# Patient Record
Sex: FEMALE | Race: WHITE | NOT HISPANIC OR LATINO | Employment: OTHER | URBAN - METROPOLITAN AREA
[De-identification: names, ages, dates, MRNs, and addresses within clinical notes are randomized per-mention and may not be internally consistent; named-entity substitution may affect disease eponyms.]

---

## 2017-02-08 ENCOUNTER — HOSPITAL ENCOUNTER (OUTPATIENT)
Dept: ULTRASOUND IMAGING | Facility: CLINIC | Age: 62
Discharge: HOME | End: 2017-02-08
Payer: COMMERCIAL

## 2017-02-08 ENCOUNTER — HOSPITAL ENCOUNTER (OUTPATIENT)
Dept: MAMMOGRAPHY | Facility: CLINIC | Age: 62
Discharge: HOME | End: 2017-02-08
Payer: COMMERCIAL

## 2017-02-08 DIAGNOSIS — N64.4 BREAST PAIN: ICD-10-CM

## 2017-02-08 DIAGNOSIS — Z09 FOLLOW-UP EXAM, 3-6 MONTHS SINCE PREVIOUS EXAM: ICD-10-CM

## 2017-02-08 DIAGNOSIS — N64.4 MASTODYNIA: ICD-10-CM

## 2017-02-08 PROCEDURE — G0204 DX MAMMO INCL CAD BI: HCPCS

## 2017-02-08 PROCEDURE — 76642 ULTRASOUND BREAST LIMITED: CPT

## 2017-02-28 ENCOUNTER — TRANSCRIBE ORDERS (OUTPATIENT)
Dept: ADMINISTRATIVE | Facility: HOSPITAL | Age: 62
End: 2017-02-28

## 2017-02-28 DIAGNOSIS — Z12.31 OTHER SCREENING MAMMOGRAM: Primary | ICD-10-CM

## 2017-11-26 ENCOUNTER — APPOINTMENT (EMERGENCY)
Dept: RADIOLOGY | Facility: HOSPITAL | Age: 62
End: 2017-11-26
Payer: COMMERCIAL

## 2017-11-26 ENCOUNTER — HOSPITAL ENCOUNTER (EMERGENCY)
Facility: HOSPITAL | Age: 62
Discharge: HOME | End: 2017-11-26
Attending: EMERGENCY MEDICINE | Admitting: EMERGENCY MEDICINE
Payer: COMMERCIAL

## 2017-11-26 VITALS
OXYGEN SATURATION: 98 % | SYSTOLIC BLOOD PRESSURE: 128 MMHG | HEIGHT: 65 IN | WEIGHT: 247.36 LBS | BODY MASS INDEX: 41.21 KG/M2 | RESPIRATION RATE: 16 BRPM | TEMPERATURE: 98 F | HEART RATE: 78 BPM | DIASTOLIC BLOOD PRESSURE: 92 MMHG

## 2017-11-26 DIAGNOSIS — T78.40XA ALLERGIC REACTION, INITIAL ENCOUNTER: Primary | ICD-10-CM

## 2017-11-26 DIAGNOSIS — R07.89 ATYPICAL CHEST PAIN: ICD-10-CM

## 2017-11-26 LAB
ANION GAP SERPL CALCULATED.3IONS-SCNC: 8 MMOL/L (ref 4–13)
ATRIAL RATE: 84 BPM
BASOPHILS # BLD AUTO: 0.05 THOUSANDS/ΜL (ref 0–0.1)
BASOPHILS NFR BLD AUTO: 1 % (ref 0–1)
BUN SERPL-MCNC: 12 MG/DL (ref 5–25)
CALCIUM SERPL-MCNC: 9.1 MG/DL (ref 8.3–10.1)
CHLORIDE SERPL-SCNC: 102 MMOL/L (ref 100–108)
CO2 SERPL-SCNC: 28 MMOL/L (ref 21–32)
CREAT SERPL-MCNC: 0.89 MG/DL (ref 0.6–1.3)
EOSINOPHIL # BLD AUTO: 0.55 THOUSAND/ΜL (ref 0–0.61)
EOSINOPHIL NFR BLD AUTO: 8 % (ref 0–6)
ERYTHROCYTE [DISTWIDTH] IN BLOOD BY AUTOMATED COUNT: 15.3 % (ref 11.6–15.1)
GFR SERPL CREATININE-BSD FRML MDRD: 71 ML/MIN/1.73SQ M
GLUCOSE SERPL-MCNC: 212 MG/DL (ref 65–140)
HCT VFR BLD AUTO: 35.1 % (ref 34.8–46.1)
HGB BLD-MCNC: 10.8 G/DL (ref 11.5–15.4)
LYMPHOCYTES # BLD AUTO: 1.83 THOUSANDS/ΜL (ref 0.6–4.47)
LYMPHOCYTES NFR BLD AUTO: 27 % (ref 14–44)
MCH RBC QN AUTO: 23.9 PG (ref 26.8–34.3)
MCHC RBC AUTO-ENTMCNC: 30.8 G/DL (ref 31.4–37.4)
MCV RBC AUTO: 78 FL (ref 82–98)
MONOCYTES # BLD AUTO: 0.5 THOUSAND/ΜL (ref 0.17–1.22)
MONOCYTES NFR BLD AUTO: 7 % (ref 4–12)
NEUTROPHILS # BLD AUTO: 3.8 THOUSANDS/ΜL (ref 1.85–7.62)
NEUTS SEG NFR BLD AUTO: 57 % (ref 43–75)
P AXIS: 53 DEGREES
PLATELET # BLD AUTO: 368 THOUSANDS/UL (ref 149–390)
PMV BLD AUTO: 9.5 FL (ref 8.9–12.7)
POTASSIUM SERPL-SCNC: 3.9 MMOL/L (ref 3.5–5.3)
PR INTERVAL: 150 MS
QRS AXIS: 52 DEGREES
QRSD INTERVAL: 74 MS
QT INTERVAL: 342 MS
QTC INTERVAL: 404 MS
RBC # BLD AUTO: 4.52 MILLION/UL (ref 3.81–5.12)
SODIUM SERPL-SCNC: 138 MMOL/L (ref 136–145)
T WAVE AXIS: 28 DEGREES
TROPONIN I SERPL-MCNC: <0.02 NG/ML
VENTRICULAR RATE: 84 BPM
WBC # BLD AUTO: 6.73 THOUSAND/UL (ref 4.31–10.16)

## 2017-11-26 PROCEDURE — 36415 COLL VENOUS BLD VENIPUNCTURE: CPT | Performed by: PHYSICIAN ASSISTANT

## 2017-11-26 PROCEDURE — 99285 EMERGENCY DEPT VISIT HI MDM: CPT

## 2017-11-26 PROCEDURE — 93005 ELECTROCARDIOGRAM TRACING: CPT | Performed by: PHYSICIAN ASSISTANT

## 2017-11-26 PROCEDURE — 85025 COMPLETE CBC W/AUTO DIFF WBC: CPT | Performed by: PHYSICIAN ASSISTANT

## 2017-11-26 PROCEDURE — 96375 TX/PRO/DX INJ NEW DRUG ADDON: CPT

## 2017-11-26 PROCEDURE — 71020 HB CHEST X-RAY 2VW FRONTAL&LATL: CPT

## 2017-11-26 PROCEDURE — 84484 ASSAY OF TROPONIN QUANT: CPT | Performed by: PHYSICIAN ASSISTANT

## 2017-11-26 PROCEDURE — 96374 THER/PROPH/DIAG INJ IV PUSH: CPT

## 2017-11-26 PROCEDURE — 80048 BASIC METABOLIC PNL TOTAL CA: CPT | Performed by: PHYSICIAN ASSISTANT

## 2017-11-26 RX ORDER — PREDNISONE 20 MG/1
40 TABLET ORAL DAILY
Qty: 10 TABLET | Refills: 0 | Status: SHIPPED | OUTPATIENT
Start: 2017-11-26 | End: 2017-12-01

## 2017-11-26 RX ORDER — HYDROXYZINE HYDROCHLORIDE 25 MG/1
25 TABLET, FILM COATED ORAL 3 TIMES DAILY PRN
Qty: 30 TABLET | Refills: 0 | Status: SHIPPED | OUTPATIENT
Start: 2017-11-26 | End: 2019-09-04 | Stop reason: ALTCHOICE

## 2017-11-26 RX ORDER — DIPHENHYDRAMINE HYDROCHLORIDE 50 MG/ML
25 INJECTION INTRAMUSCULAR; INTRAVENOUS ONCE
Status: COMPLETED | OUTPATIENT
Start: 2017-11-26 | End: 2017-11-26

## 2017-11-26 RX ORDER — METHYLPREDNISOLONE SODIUM SUCCINATE 125 MG/2ML
80 INJECTION, POWDER, LYOPHILIZED, FOR SOLUTION INTRAMUSCULAR; INTRAVENOUS ONCE
Status: COMPLETED | OUTPATIENT
Start: 2017-11-26 | End: 2017-11-26

## 2017-11-26 RX ADMIN — METHYLPREDNISOLONE SODIUM SUCCINATE 80 MG: 125 INJECTION, POWDER, FOR SOLUTION INTRAMUSCULAR; INTRAVENOUS at 11:54

## 2017-11-26 RX ADMIN — DIPHENHYDRAMINE HYDROCHLORIDE 25 MG: 50 INJECTION, SOLUTION INTRAMUSCULAR; INTRAVENOUS at 11:48

## 2017-11-26 NOTE — DISCHARGE INSTRUCTIONS
Chest Pain   WHAT YOU NEED TO KNOW:   Chest pain can be caused by a range of conditions, from not serious to life-threatening  Chest pain can be a symptom of a digestive problem, such as acid reflux or a stomach ulcer  An anxiety attack or a strong emotion, such as anger, can also cause chest pain  Infection, inflammation, or a fracture in the bones or cartilage in your chest can cause pain or discomfort  Sometimes chest pain or pressure is caused by poor blood flow to your heart (angina)  Chest pain may also be caused by life-threatening conditions such as a heart attack or blood clot in your lungs  DISCHARGE INSTRUCTIONS:   Call 911 if:   · You have any of the following signs of a heart attack:      ¨ Squeezing, pressure, or pain in your chest that lasts longer than 5 minutes or returns    ¨ Discomfort or pain in your back, neck, jaw, stomach, or arm     ¨ Trouble breathing    ¨ Nausea or vomiting    ¨ Lightheadedness or a sudden cold sweat, especially with chest pain or trouble breathing    Return to the emergency department if:   · You have chest discomfort that gets worse, even with medicine  · You cough or vomit blood  · Your bowel movements are black or bloody  · You cannot stop vomiting, or it hurts to swallow  Contact your healthcare provider if:   · You have questions or concerns about your condition or care  Medicines:   · Medicines  may be given to treat the cause of your chest pain  Examples include pain medicine, anxiety medicine, or medicines to increase blood flow to your heart  · Do not take certain medicines without asking your healthcare provider first   These include NSAIDs, herbal or vitamin supplements, or hormones (estrogen or progestin)  · Take your medicine as directed  Contact your healthcare provider if you think your medicine is not helping or if you have side effects  Tell him or her if you are allergic to any medicine   Keep a list of the medicines, vitamins, and herbs you take  Include the amounts, and when and why you take them  Bring the list or the pill bottles to follow-up visits  Carry your medicine list with you in case of an emergency  Follow up with your healthcare provider within 72 hours, or as directed: You may need to return for more tests to find the cause of your chest pain  You may be referred to a specialist, such as a cardiologist or gastroenterologist  Write down your questions so you remember to ask them during your visits  Healthy living tips: The following are general healthy guidelines  If your chest pain is caused by a heart problem, your healthcare provider will give you specific guidelines to follow  · Do not smoke  Nicotine and other chemicals in cigarettes and cigars can cause lung and heart damage  Ask your healthcare provider for information if you currently smoke and need help to quit  E-cigarettes or smokeless tobacco still contain nicotine  Talk to your healthcare provider before you use these products  · Eat a variety of healthy, low-fat foods  Healthy foods include fruits, vegetables, whole-grain breads, low-fat dairy products, beans, lean meats, and fish  Ask for more information about a heart healthy diet  · Ask about activity  Your healthcare provider will tell you which activities to limit or avoid  Ask when you can drive, return to work, and have sex  Ask about the best exercise plan for you  · Maintain a healthy weight  Ask your healthcare provider how much you should weigh  Ask him or her to help you create a weight loss plan if you are overweight  · Get the flu and pneumonia vaccines  All adults should get the influenza (flu) vaccine  Get it every year as soon as it becomes available  The pneumococcal vaccine is given to adults aged 72 years or older  The vaccine is given every 5 years to prevent pneumococcal disease, such as pneumonia    © 2017 [employer] Information is for End User's use only and may not be sold, redistributed or otherwise used for commercial purposes  All illustrations and images included in CareNotes® are the copyrighted property of A D A M [employer] Inc  or Vitaliy Melendez  The above information is an  only  It is not intended as medical advice for individual conditions or treatments  Talk to your doctor, nurse or pharmacist before following any medical regimen to see if it is safe and effective for you  General Allergic Reaction   WHAT YOU NEED TO KNOW:   An allergic reaction is your body's response to an allergen  Allergens include medicines, food, insect stings, animal dander, mold, latex, chemicals, and dust mites  Pollen from trees, grass, and weeds can also cause an allergic reaction  DISCHARGE INSTRUCTIONS:   Return to the emergency department if:   · You have a skin rash, hives, swelling, or itching that gets worse  · You have trouble breathing, shortness of breath, wheezing, or coughing  · Your throat tightens, or your lips or tongue swell  · You have trouble swallowing or speaking  · You have dizziness, lightheadedness, fainting, or confusion  · You have nausea, vomiting, diarrhea, or abdominal cramps  · You have chest pain or tightness  Contact your healthcare provider if:   · You have questions or concerns about your condition or care  Medicines:   · Medicines  may be given to relieve certain allergy symptoms such as itching, sneezing, and swelling  You may take them as a pill or use drops in your nose or eyes  Topical treatments may be given to put directly on your skin to help decrease itching or swelling  · Take your medicine as directed  Contact your healthcare provider if you think your medicine is not helping or if you have side effects  Tell him of her if you are allergic to any medicine  Keep a list of the medicines, vitamins, and herbs you take  Include the amounts, and when and why you take them  Bring the list or the pill bottles to follow-up visits  Carry your medicine list with you in case of an emergency  Follow up with your healthcare provider as directed:  Write down your questions so you remember to ask them during your visits  Self-care:   · Avoid the allergen  that you think may have caused your allergic reaction  · Use cold compresses  on your skin or eyes if they were affected by the allergic reaction  Cold compresses may help to soothe your skin or eyes  · Rinse your nasal passages  with a saline solution  Daily rinsing may help clear your nose of allergens  · Do not smoke  Your allergy symptoms may decrease if you are not around smoke  Nicotine and other chemicals in cigarettes and cigars can also cause lung damage  Ask your healthcare provider for information if you currently smoke and need help to quit  E-cigarettes or smokeless tobacco still contain nicotine  Talk to your healthcare provider before you use these products  © 2017 [employer]  Information is for End User's use only and may not be sold, redistributed or otherwise used for commercial purposes  All illustrations and images included in CareNotes® are the copyrighted property of [employer]  or Vitaliy Melendez  The above information is an  only  It is not intended as medical advice for individual conditions or treatments  Talk to your doctor, nurse or pharmacist before following any medical regimen to see if it is safe and effective for you

## 2017-11-26 NOTE — ED PROVIDER NOTES
History  Chief Complaint   Patient presents with    Rash     Patient presents to the ED for evaluation and treatment of generalized rash that started overnight  Patient stated that she was a little itchy before going to bed, but thought it was her normal "itching" related to dry skin and psoariasis  Awoke today with hives and increased itching  Patient did begin taking a medication - has been on it before, but new   No benadryl PTA    Chest Pain       61-year-old female presents emergency room for evaluation itchy rash  Onset last night progressively increased this morning when she awoke  Generalized throughout body  Patient takes allergy medicine daily for chronic rash along her breast directed by dermatologist   She states she did take this this morning it did not help rash  Patient states 1 of the many fractures of her medications had changed and she just started taking   Her new prescription yesterday for the 1st time  Denies shortness of breath however complains of chest heaviness  States this has been ongoing for the past 3 days  Denies chest pain  Denies cough, throat or tongue swelling  The patient is being treated for high cholesterol and diabetes  She does have a positive family history for significant heart disease as her brother had heart attack at age 54  History provided by:  Patient  Rash   Associated symptoms: no fever and no shortness of breath    Chest Pain   Associated symptoms: no back pain, no dysphagia, no fever, no palpitations and no shortness of breath        Prior to Admission Medications   Prescriptions Last Dose Informant Patient Reported? Taking? ALPRAZolam (XANAX) 0 5 mg tablet   Yes Yes   Sig: Take 0 5 mg by mouth daily at bedtime as needed for anxiety  Ascorbic Acid (VITAMIN C) 1000 MG tablet   Yes Yes   Sig: Take 1,000 mg by mouth daily     METHSCOPOLAMINE BROMIDE PO   Yes Yes   Sig: Take 2 5 mg by mouth 2 (two) times a day as needed UNKNOWN TO PATIENT   Yes Yes   Sig: Take 1 tablet by mouth 2 (two) times a day   aspirin 81 MG tablet   Yes No   Sig: Take 81 mg by mouth daily  atorvastatin (LIPITOR) 20 mg tablet   Yes Yes   Sig: Take 20 mg by mouth daily  calcium carbonate (TUMS EX) 750 mg chewable tablet   Yes No   Sig: Chew 1 tablet daily  estradiol (ESTRACE) 1 mg tablet   Yes Yes   Sig: Take 1 mg by mouth daily  metFORMIN (GLUCOPHAGE) 1000 MG tablet   Yes Yes   Sig: Take 1,000 mg by mouth 2 (two) times a day with meals     omeprazole (PriLOSEC) 40 MG capsule   Yes Yes   Sig: Take 40 mg by mouth daily  Facility-Administered Medications: None       History reviewed  No pertinent past medical history  History reviewed  No pertinent surgical history  History reviewed  No pertinent family history  I have reviewed and agree with the history as documented  Social History   Substance Use Topics    Smoking status: Former Smoker    Smokeless tobacco: Never Used    Alcohol use 1 8 oz/week     3 Glasses of wine per week        Review of Systems   Constitutional: Negative for chills and fever  HENT: Negative for facial swelling, trouble swallowing and voice change  Eyes: Negative for redness  Respiratory: Negative for chest tightness and shortness of breath  Cardiovascular: Positive for chest pain  Negative for palpitations  Musculoskeletal: Negative for back pain and neck pain  Skin: Positive for rash         Physical Exam  ED Triage Vitals [11/26/17 1125]   Temperature Pulse Respirations Blood Pressure SpO2   98 °F (36 7 °C) 91 16 167/89 96 %      Temp Source Heart Rate Source Patient Position - Orthostatic VS BP Location FiO2 (%)   Oral Monitor Lying Right arm --      Pain Score       6           Orthostatic Vital Signs  Vitals:    11/26/17 1125 11/26/17 1145 11/26/17 1200   BP: 167/89 154/88 151/88   Pulse: 91 86 82   Patient Position - Orthostatic VS: Lying Sitting Sitting       Physical Exam   Constitutional: She appears well-developed and well-nourished  HENT:   Head: Normocephalic and atraumatic  Mouth/Throat: Oropharynx is clear and moist    Eyes: Conjunctivae are normal    Neck: Neck supple  Cardiovascular: Normal rate, regular rhythm and normal heart sounds  Pulmonary/Chest: Effort normal and breath sounds normal  She exhibits no tenderness  Abdominal: Soft  Bowel sounds are normal  She exhibits no distension  There is no tenderness  There is no CVA tenderness  Neurological: She is alert  Skin: Skin is warm and dry  Rash noted  No ecchymosis, no petechiae and no purpura noted  Rash is maculopapular and urticarial  Rash is not vesicular  There is erythema  Generalized throughout body   Psychiatric: She has a normal mood and affect  Nursing note and vitals reviewed  ED Medications  Medications   diphenhydrAMINE (BENADRYL) injection 25 mg (25 mg Intravenous Given 11/26/17 1148)   methylPREDNISolone sodium succinate (Solu-MEDROL) injection 80 mg (80 mg Intravenous Given 11/26/17 1154)       Diagnostic Studies  Results Reviewed     Procedure Component Value Units Date/Time    Troponin I [[id number]]  (Normal) Collected:  11/26/17 1149    Lab Status:  Final result Specimen:  Blood from Arm, Left Updated:  11/26/17 1215     Troponin I <0 02 ng/mL     Narrative:         [employer] Chemistry analyzer 99% cutoff is > 0 04 ng/mL in network labs    o cTnI 99% cutoff is useful only when applied to patients in the clinical setting of myocardial ischemia  o cTnI 99% cutoff should be interpreted in the context of clinical history, ECG findings and possibly cardiac imaging to establish correct diagnosis  o cTnI 99% cutoff may be suggestive but clearly not indicative of a coronary event without the clinical setting of myocardial ischemia      Basic metabolic panel [[id number]]  (Abnormal) Collected:  11/26/17 1149    Lab Status:  Final result Specimen:  Blood from Arm, Left Updated:  11/26/17 1207     Sodium 138 mmol/L      Potassium 3 9 mmol/L      Chloride 102 mmol/L      CO2 28 mmol/L      Anion Gap 8 mmol/L      BUN 12 mg/dL      Creatinine 0 89 mg/dL      Glucose 212 (H) mg/dL      Calcium 9 1 mg/dL      eGFR 71 ml/min/1 73sq m     Narrative:         National Kidney Disease Education Program recommendations are as follows:  GFR calculation is accurate only with a steady state creatinine  Chronic Kidney disease less than 60 ml/min/1 73 sq  meters  Kidney failure less than 15 ml/min/1 73 sq  meters      CBC and differential [[id number]]  (Abnormal) Collected:  11/26/17 1149    Lab Status:  Final result Specimen:  Blood from Arm, Left Updated:  11/26/17 1156     WBC 6 73 Thousand/uL      RBC 4 52 Million/uL      Hemoglobin 10 8 (L) g/dL      Hematocrit 35 1 %      MCV 78 (L) fL      MCH 23 9 (L) pg      MCHC 30 8 (L) g/dL      RDW 15 3 (H) %      MPV 9 5 fL      Platelets 952 Thousands/uL      Neutrophils Relative 57 %      Lymphocytes Relative 27 %      Monocytes Relative 7 %      Eosinophils Relative 8 (H) %      Basophils Relative 1 %      Neutrophils Absolute 3 80 Thousands/µL      Lymphocytes Absolute 1 83 Thousands/µL      Monocytes Absolute 0 50 Thousand/µL      Eosinophils Absolute 0 55 Thousand/µL      Basophils Absolute 0 05 Thousands/µL                  XR chest 2 views   ED Interpretation by Carlena Lennox, PA-C (11/26 1234)   No acute disease                 Procedures  ECG 12 Lead Documentation  Date/Time: 11/26/2017 3:24 PM  Performed by: Christian Linares  Authorized by: Ellis Barrios     Indications / Diagnosis:  Chest pain  ECG reviewed by me, the ED Provider: yes    Patient location:  ED  Interpretation:     Interpretation: normal    Rate:     ECG rate:  84    ECG rate assessment: normal    Rhythm:     Rhythm: sinus rhythm    Ectopy:     Ectopy: none    QRS:     QRS axis:  Normal  Conduction:     Conduction: normal    ST segments:     ST segments:  Normal  T waves:     T waves: normal             Phone Contacts  ED Phone Contact    ED Course  ED Course                                MDM  Number of Diagnoses or Management Options  Allergic reaction, initial encounter: new and requires workup  Atypical chest pain: new and requires workup     Amount and/or Complexity of Data Reviewed  Clinical lab tests: ordered and reviewed  Tests in the radiology section of CPT®: ordered and reviewed    Risk of Complications, Morbidity, and/or Mortality  Presenting problems: moderate  Diagnostic procedures: moderate  General comments: Differential diagnosis includes but is not limited to: atypical acs, angina, pnx, pna, allergic reaction      Discussed with patient importance of monitoring her sugars at home while on prednisone as it may elevate her sugar  She understands and agrees to do so  Patient Progress  Patient progress: stable    CritCare Time    Disposition  Final diagnoses: Allergic reaction, initial encounter   Atypical chest pain     Time reflects when diagnosis was documented in both MDM as applicable and the Disposition within this note     Time User Action Codes Description Comment    11/26/2017 12:44 PM Fabrice NARANJO Add [T78 [id number]] Allergic reaction, initial encounter     11/26/2017 12:45 PM Ashlee Rout Add [R07 [id number]] Atypical chest pain       ED Disposition     ED Disposition Condition Comment    Discharge  [place] [id number] discharge to home/self care      Condition at discharge: Good        Follow-up Information     Follow up With Specialties Details Why Contact Info Additional Information    Noris Covington MD  In 3 days  [address]  [address]       [facility] Emergency Department Emergency Medicine  If symptoms worsen [address] [facility] [address] [phone] [facility] ED, [address], [place], [place], [address]        Patient's Medications   Discharge Prescriptions    HYDROXYZINE HCL (ATARAX) 25 MG TABLET    Take 1 tablet by mouth 3 (three) times a day as needed for itching for up to 10 days       Start Date: 11/26/2017End Date: 12/6/2017       Order Dose: 25 mg       Quantity: 30 tablet    Refills: 0    PREDNISONE 20 MG TABLET    Take 2 tablets by mouth daily for 5 days       Start Date: 11/26/2017End Date: 12/1/2017       Order Dose: 40 mg       Quantity: 10 tablet    Refills: 0     No discharge procedures on file      ED Provider  Electronically Signed by           Ralph Wallace PA-C  11/26/17 1522

## 2018-02-14 ENCOUNTER — HOSPITAL ENCOUNTER (OUTPATIENT)
Dept: ULTRASOUND IMAGING | Facility: CLINIC | Age: 63
Discharge: HOME | End: 2018-02-14
Payer: COMMERCIAL

## 2018-02-14 ENCOUNTER — HOSPITAL ENCOUNTER (OUTPATIENT)
Dept: MAMMOGRAPHY | Facility: CLINIC | Age: 63
Discharge: HOME | End: 2018-02-14
Payer: COMMERCIAL

## 2018-02-14 DIAGNOSIS — R92.8 ABNORMAL FINDINGS ON DIAGNOSTIC IMAGING OF BREAST: ICD-10-CM

## 2018-02-14 DIAGNOSIS — Z12.31 OTHER SCREENING MAMMOGRAM: ICD-10-CM

## 2018-02-14 PROCEDURE — 77067 SCR MAMMO BI INCL CAD: CPT

## 2018-02-14 PROCEDURE — 76642 ULTRASOUND BREAST LIMITED: CPT

## 2018-11-16 ENCOUNTER — TRANSCRIBE ORDERS (OUTPATIENT)
Dept: LAB | Facility: CLINIC | Age: 63
End: 2018-11-16

## 2018-11-16 ENCOUNTER — APPOINTMENT (OUTPATIENT)
Dept: LAB | Facility: CLINIC | Age: 63
End: 2018-11-16
Payer: COMMERCIAL

## 2018-11-16 DIAGNOSIS — E11.9 DIABETES MELLITUS WITHOUT COMPLICATION (HCC): Primary | ICD-10-CM

## 2018-11-16 LAB
EST. AVERAGE GLUCOSE BLD GHB EST-MCNC: 174 MG/DL
HBA1C MFR BLD: 7.7 % (ref 4.2–6.3)

## 2018-11-16 PROCEDURE — 36415 COLL VENOUS BLD VENIPUNCTURE: CPT | Performed by: INTERNAL MEDICINE

## 2018-11-16 PROCEDURE — 83036 HEMOGLOBIN GLYCOSYLATED A1C: CPT | Performed by: INTERNAL MEDICINE

## 2018-11-29 ENCOUNTER — TRANSCRIBE ORDERS (OUTPATIENT)
Dept: ADMINISTRATIVE | Facility: HOSPITAL | Age: 63
End: 2018-11-29

## 2018-11-29 DIAGNOSIS — Z12.39 SCREENING BREAST EXAMINATION: Primary | ICD-10-CM

## 2019-02-13 ENCOUNTER — ANNUAL EXAM (OUTPATIENT)
Dept: OBGYN CLINIC | Facility: CLINIC | Age: 64
End: 2019-02-13
Payer: COMMERCIAL

## 2019-02-13 VITALS
WEIGHT: 232 LBS | DIASTOLIC BLOOD PRESSURE: 80 MMHG | HEIGHT: 65 IN | SYSTOLIC BLOOD PRESSURE: 124 MMHG | BODY MASS INDEX: 38.65 KG/M2

## 2019-02-13 DIAGNOSIS — Z12.39 SCREENING BREAST EXAMINATION: ICD-10-CM

## 2019-02-13 DIAGNOSIS — N95.2 VAGINAL ATROPHY: ICD-10-CM

## 2019-02-13 DIAGNOSIS — G47.00 INSOMNIA, UNSPECIFIED TYPE: ICD-10-CM

## 2019-02-13 DIAGNOSIS — Z01.419 ENCOUNTER FOR ANNUAL ROUTINE GYNECOLOGICAL EXAMINATION: Primary | ICD-10-CM

## 2019-02-13 PROCEDURE — S0612 ANNUAL GYNECOLOGICAL EXAMINA: HCPCS | Performed by: OBSTETRICS & GYNECOLOGY

## 2019-02-13 RX ORDER — LORAZEPAM 0.5 MG/1
1 TABLET ORAL
Qty: 90 TABLET | Refills: 3 | Status: SHIPPED | OUTPATIENT
Start: 2019-02-13

## 2019-02-13 RX ORDER — LORAZEPAM 0.5 MG/1
TABLET ORAL EVERY 8 HOURS PRN
COMMUNITY
End: 2019-02-13 | Stop reason: SDUPTHER

## 2019-02-13 RX ORDER — ESTRADIOL 1 MG/1
1 TABLET ORAL DAILY
Qty: 90 TABLET | Refills: 3 | Status: SHIPPED | OUTPATIENT
Start: 2019-02-13

## 2019-02-13 NOTE — PROGRESS NOTES
Pt is here for yearly exam  PT is feel well, no bleeding or pelvic pain  PT does feel bloated, every morning  Pt is seeing Gastro

## 2019-02-13 NOTE — PROGRESS NOTES
Pt had UTI in summer, it resolved  Pt being given script for mammogram  Pt denies bleeding, pain, and breast problems  Pt had a colonoscopy last year with Dr Stacy Coppola  No polyps on colonoscopy  Pt had upper endoscopy at the same time  Physical Exam  Heart and Lungs WNL   Breast exam: WNL   Pelvic: absent uterus, no CMT, no adnexa b/l, no salpinx b/l   Gyn otherwise WNL     Impression  Normal GYN exam     Plan  Pt going for mammogram on Friday  Will review results  Lorazepam dose increased to 1 mg (dispensed 90 given 3 refills)   Given refill for estradiol

## 2019-02-15 ENCOUNTER — HOSPITAL ENCOUNTER (OUTPATIENT)
Dept: MAMMOGRAPHY | Facility: CLINIC | Age: 64
Discharge: HOME | End: 2019-02-15
Payer: COMMERCIAL

## 2019-02-15 ENCOUNTER — TRANSCRIBE ORDERS (OUTPATIENT)
Dept: ADMINISTRATIVE | Facility: HOSPITAL | Age: 64
End: 2019-02-15

## 2019-02-15 VITALS — HEIGHT: 65 IN | WEIGHT: 232 LBS | BODY MASS INDEX: 38.65 KG/M2

## 2019-02-15 DIAGNOSIS — Z12.31 ENCOUNTER FOR SCREENING MAMMOGRAM FOR MALIGNANT NEOPLASM OF BREAST: Primary | ICD-10-CM

## 2019-02-15 DIAGNOSIS — Z12.39 SCREENING BREAST EXAMINATION: ICD-10-CM

## 2019-02-15 PROCEDURE — 77067 SCR MAMMO BI INCL CAD: CPT

## 2019-09-04 ENCOUNTER — HOSPITAL ENCOUNTER (OUTPATIENT)
Dept: RADIOLOGY | Age: 64
Discharge: HOME | End: 2019-09-04
Payer: COMMERCIAL

## 2019-09-04 ENCOUNTER — OFFICE VISIT (OUTPATIENT)
Dept: OBGYN CLINIC | Facility: CLINIC | Age: 64
End: 2019-09-04
Payer: COMMERCIAL

## 2019-09-04 VITALS
BODY MASS INDEX: 35.16 KG/M2 | HEIGHT: 65 IN | WEIGHT: 211 LBS | DIASTOLIC BLOOD PRESSURE: 74 MMHG | SYSTOLIC BLOOD PRESSURE: 112 MMHG

## 2019-09-04 DIAGNOSIS — R10.2 PELVIC PAIN: Primary | ICD-10-CM

## 2019-09-04 DIAGNOSIS — R10.2 PELVIC PAIN: ICD-10-CM

## 2019-09-04 PROCEDURE — 76856 US EXAM PELVIC COMPLETE: CPT

## 2019-09-04 PROCEDURE — 76830 TRANSVAGINAL US NON-OB: CPT

## 2019-09-04 PROCEDURE — 99213 OFFICE O/P EST LOW 20 MIN: CPT | Performed by: PHYSICIAN ASSISTANT

## 2019-09-04 RX ORDER — ATORVASTATIN CALCIUM 20 MG/1
20 TABLET, FILM COATED ORAL DAILY
COMMUNITY
Start: 2019-04-05 | End: 2021-01-21 | Stop reason: SDUPTHER

## 2019-09-04 RX ORDER — ALBUTEROL SULFATE 90 UG/1
2 AEROSOL, METERED RESPIRATORY (INHALATION) EVERY 4 HOURS PRN
COMMUNITY
Start: 2019-04-22

## 2019-09-04 RX ORDER — LORAZEPAM 0.5 MG/1
TABLET ORAL
COMMUNITY
Start: 2019-03-04 | End: 2021-01-21 | Stop reason: SDUPTHER

## 2019-09-04 RX ORDER — METFORMIN HYDROCHLORIDE 500 MG/1
TABLET, EXTENDED RELEASE ORAL
COMMUNITY
Start: 2019-08-29

## 2019-09-04 RX ORDER — FLUNISOLIDE 0.25 MG/ML
2 SOLUTION NASAL 2 TIMES DAILY
COMMUNITY
Start: 2019-08-22

## 2019-09-04 RX ORDER — HYDROXYZINE HYDROCHLORIDE 10 MG/1
10 TABLET, FILM COATED ORAL 3 TIMES DAILY PRN
COMMUNITY

## 2019-09-04 RX ORDER — FLUNISOLIDE 0.25 MG/ML
SOLUTION NASAL
Refills: 3 | COMMUNITY
Start: 2019-08-27

## 2019-09-04 NOTE — PROGRESS NOTES
Assessment/Plan:    No problem-specific Assessment & Plan notes found for this encounter  Diagnoses and all orders for this visit:    Pelvic pain  -     US pelvis complete non OB; Future  -     GP Vaginitis/Vaginosis W/O PAP W/O HPV  Expanded    Other orders  -     glucose blood (FREESTYLE TEST STRIPS) test strip; Use one stick daily  -     Latanoprost 0 005 % EMUL; INSTILL 1 DROP INTO BOTH EYES IN THE EVENING  -     albuterol (PROVENTIL HFA,VENTOLIN HFA) 90 mcg/act inhaler; Inhale 2 puffs every 4 (four) hours as needed  -     flunisolide (NASALIDE) 25 MCG/ACT (0 025%) SOLN; 2 sprays into each nostril 2 (two) times a day  -     flunisolide (NASALIDE) 25 MCG/ACT (0 025%) SOLN; 2 SPRAYS BY EACH NARE ROUTE 2 (TWO) TIMES A DAY  -     atorvastatin (LIPITOR) 20 mg tablet; Take 20 mg by mouth daily  -     hydrOXYzine HCL (ATARAX) 10 mg tablet; Take 10 mg by mouth Three times daily as needed  -     LORazepam (ATIVAN) 0 5 mg tablet; TAKE 2 TABLETS (1 MG TOTAL) BY MOUTH DAILY AT BEDTIME  -     metFORMIN (GLUCOPHAGE-XR) 500 mg 24 hr tablet; TAKE 2 TABLETS (1,000 MG TOTAL) BY MOUTH 2 (TWO) TIMES A DAY WITH MEALS  Subjective:      Patient ID: Luna Jarrett is a 60 y o  female  Pt here for a problem  She has had + pelvic pain/abdominal pain x 2 days--started immediately after intercourse  No bleeding  No discharge  No itching/burning  No odor/discharge  No urinary symptoms  Bowel and bladder are regular   Pt had ANTONI-BSO 2018  No gallbladder--no appendix    UA-  Check cult  Check US      The following portions of the patient's history were reviewed and updated as appropriate: allergies, current medications, past family history, past medical history, past social history, past surgical history and problem list     Review of Systems   Constitutional: Negative for chills, fever and unexpected weight change  Gastrointestinal: Negative for abdominal pain, blood in stool, constipation and diarrhea  Genitourinary: Positive for pelvic pain  Objective:      /74 (BP Location: Left arm, Patient Position: Sitting, Cuff Size: Large)   Ht 5' 5" (1 651 m)   Wt 95 7 kg (211 lb)   BMI 35 11 kg/m²          Physical Exam   Constitutional: She appears well-developed and well-nourished  Genitourinary: Vagina normal  Pelvic exam was performed with patient supine  There is no rash or lesion on the right labia  There is no rash or lesion on the left labia  Right adnexum displays tenderness  Left adnexum displays no tenderness  Lymphadenopathy: No inguinal adenopathy noted on the right or left side  Nursing note and vitals reviewed

## 2019-09-04 NOTE — PROGRESS NOTES
Patient presents with complaint of pelvic pain, had intercourse Monday 09/02, pain severity increased after episode  Pain scale 9, occassional difficulty with bending/walking  No burning with urination  No odor, no bleeding

## 2019-09-06 ENCOUNTER — TELEPHONE (OUTPATIENT)
Dept: OBGYN CLINIC | Facility: CLINIC | Age: 64
End: 2019-09-06

## 2019-09-06 ENCOUNTER — TRANSCRIBE ORDERS (OUTPATIENT)
Dept: OBGYN CLINIC | Facility: CLINIC | Age: 64
End: 2019-09-06

## 2019-09-06 LAB
A VAGINAE DNA VAG NAA+PROBE-LOG#: <3.25
A VAGINAE DNA VAG QL NAA+PROBE: NOT DETECTED
BVAB2 DNA VAG QL NAA+PROBE: NOT DETECTED
G VAGINALIS DNA SPEC QL NAA+PROBE: DETECTED
G VAGINALIS DNA VAG NAA+PROBE-LOG#: ABNORMAL
LACTOBACILLUS DNA VAG NAA+PROBE-LOG#: ABNORMAL
LACTOBACILLUS DNA VAG NAA+PROBE-LOG#: DETECTED
MEGA1 DNA VAG QL NAA+PROBE: NOT DETECTED
SL AMB C.ALBICANS BY PCR: NOT DETECTED
SL AMB CANDIDA GENUS: NOT DETECTED
T VAGINALIS RRNA SPEC QL NAA+PROBE: NOT DETECTED

## 2019-09-09 DIAGNOSIS — R19.4 ENCOUNTER FOR DIAGNOSTIC COLONOSCOPY DUE TO CHANGE IN BOWEL HABITS: Primary | ICD-10-CM

## 2019-09-10 DIAGNOSIS — B96.89 BACTERIAL VAGINOSIS: Primary | ICD-10-CM

## 2019-09-10 DIAGNOSIS — N76.0 BACTERIAL VAGINOSIS: Primary | ICD-10-CM

## 2019-09-10 RX ORDER — METRONIDAZOLE 7.5 MG/G
1 GEL VAGINAL
Qty: 70 G | Refills: 0 | Status: SHIPPED | OUTPATIENT
Start: 2019-09-10 | End: 2021-01-21 | Stop reason: ALTCHOICE

## 2020-08-25 ENCOUNTER — TELEPHONE (OUTPATIENT)
Dept: OBGYN CLINIC | Facility: CLINIC | Age: 65
End: 2020-08-25

## 2020-08-25 NOTE — TELEPHONE ENCOUNTER
I scheduled a this patient for an office visit on 8/26/20 at 3:30 for a problem visit  Her insurance is not accepted at our office and she said she will be a self-pay   Could you give her a estimate on how much her visit would cost?

## 2020-08-26 ENCOUNTER — OFFICE VISIT (OUTPATIENT)
Dept: OBGYN CLINIC | Facility: CLINIC | Age: 65
End: 2020-08-26

## 2020-08-26 VITALS
BODY MASS INDEX: 36.49 KG/M2 | WEIGHT: 219 LBS | HEIGHT: 65 IN | DIASTOLIC BLOOD PRESSURE: 74 MMHG | SYSTOLIC BLOOD PRESSURE: 114 MMHG

## 2020-08-26 DIAGNOSIS — R10.2 PELVIC PAIN: ICD-10-CM

## 2020-08-26 DIAGNOSIS — R30.0 DYSURIA: Primary | ICD-10-CM

## 2020-08-26 PROCEDURE — 99213 OFFICE O/P EST LOW 20 MIN: CPT | Performed by: PHYSICIAN ASSISTANT

## 2020-08-26 RX ORDER — NITROFURANTOIN 25; 75 MG/1; MG/1
100 CAPSULE ORAL 2 TIMES DAILY
Qty: 14 CAPSULE | Refills: 1 | Status: SHIPPED | OUTPATIENT
Start: 2020-08-26 | End: 2020-09-02

## 2020-08-26 NOTE — PROGRESS NOTES
Assessment/Plan:    No problem-specific Assessment & Plan notes found for this encounter  Diagnoses and all orders for this visit:    Dysuria  -     nitrofurantoin (MACROBID) 100 mg capsule; Take 1 capsule (100 mg total) by mouth 2 (two) times a day for 7 days  -     terconazole (TERAZOL 3) 0 8 % vaginal cream; Insert 1 applicator into the vagina daily at bedtime for 3 days    Pelvic pain  -     nitrofurantoin (MACROBID) 100 mg capsule; Take 1 capsule (100 mg total) by mouth 2 (two) times a day for 7 days  -     terconazole (TERAZOL 3) 0 8 % vaginal cream; Insert 1 applicator into the vagina daily at bedtime for 3 days          Subjective:      Patient ID: Alison Villalobos is a 60 y o  female  Pt complains of pelvic pain and increased urination x few days  strted after intercourse  +imcomplete emptying  Has happened before   H/o uti  H/o bv  No fever chills  No lbp  No hematuria    Unable to dip urine due to AZO  rx macrobid  rx terazol 3 cream      The following portions of the patient's history were reviewed and updated as appropriate: allergies, current medications, past family history, past medical history, past social history, past surgical history and problem list     Review of Systems   Constitutional: Negative for chills, fever and unexpected weight change  Gastrointestinal: Negative for abdominal pain, blood in stool, constipation and diarrhea  Genitourinary: Positive for frequency and pelvic pain  Negative for flank pain, genital sores and hematuria  Objective:      /74   Ht 5' 5" (1 651 m)   Wt 99 3 kg (219 lb)   BMI 36 44 kg/m²          Physical Exam  Vitals signs and nursing note reviewed  Constitutional:       Appearance: She is well-developed  Genitourinary:     Exam position: Supine  Labia:         Right: No rash or lesion  Left: No rash or lesion  Vagina: Normal       Cervix: No cervical motion tenderness, discharge or friability  Lymphadenopathy:      Lower Body: No right inguinal adenopathy  No left inguinal adenopathy

## 2020-08-26 NOTE — PROGRESS NOTES
Patient is here with lower abdominal pain, currently a 3 sometimes a 8 or 9  Patient states the pain in constant, sometimes worse at time  Usually worse at night  This pain started with intercourse  Patient has had no bleeding, no discharge burning or itching  Unable to sip urine due to patient taking OTC medication

## 2020-09-14 ENCOUNTER — TELEPHONE (OUTPATIENT)
Dept: OBGYN CLINIC | Facility: CLINIC | Age: 65
End: 2020-09-14

## 2020-09-15 NOTE — TELEPHONE ENCOUNTER
9/19 US normal--pt w ANTONI-BSO  S/p macrobid and terazol  If still w pain, we can do cultures and UA c and s here  Adv pt not to take AZO before visit

## 2020-09-16 ENCOUNTER — OFFICE VISIT (OUTPATIENT)
Dept: OBGYN CLINIC | Facility: CLINIC | Age: 65
End: 2020-09-16

## 2020-09-16 VITALS
WEIGHT: 230 LBS | DIASTOLIC BLOOD PRESSURE: 82 MMHG | BODY MASS INDEX: 38.32 KG/M2 | HEIGHT: 65 IN | SYSTOLIC BLOOD PRESSURE: 124 MMHG

## 2020-09-16 DIAGNOSIS — N72 ACUTE CERVICITIS: ICD-10-CM

## 2020-09-16 DIAGNOSIS — Z11.3 SCREENING EXAMINATION FOR STD (SEXUALLY TRANSMITTED DISEASE): ICD-10-CM

## 2020-09-16 DIAGNOSIS — Z11.3 ROUTINE SCREENING FOR STI (SEXUALLY TRANSMITTED INFECTION): ICD-10-CM

## 2020-09-16 DIAGNOSIS — R30.9 PAIN WITH URINATION: ICD-10-CM

## 2020-09-16 DIAGNOSIS — N76.0 ACUTE VAGINITIS: ICD-10-CM

## 2020-09-16 DIAGNOSIS — Z11.8 SPECIAL SCREENING EXAMINATION FOR CHLAMYDIAL DISEASE: ICD-10-CM

## 2020-09-16 DIAGNOSIS — R10.2 PELVIC PAIN: Primary | ICD-10-CM

## 2020-09-16 DIAGNOSIS — R35.0 URINE FREQUENCY: ICD-10-CM

## 2020-09-16 LAB
SL AMB  POCT GLUCOSE, UA: NEGATIVE
SL AMB LEUKOCYTE ESTERASE,UA: NEGATIVE
SL AMB POCT BILIRUBIN,UA: NEGATIVE
SL AMB POCT BLOOD,UA: NEGATIVE
SL AMB POCT KETONES,UA: NEGATIVE
SL AMB POCT NITRITE,UA: NEGATIVE
SL AMB POCT PH,UA: 5
SL AMB POCT SPECIFIC GRAVITY,UA: NEGATIVE
SL AMB POCT URINE PROTEIN: NEGATIVE
SL AMB POCT UROBILINOGEN: NEGATIVE

## 2020-09-16 PROCEDURE — 99213 OFFICE O/P EST LOW 20 MIN: CPT | Performed by: PHYSICIAN ASSISTANT

## 2020-09-16 PROCEDURE — 81002 URINALYSIS NONAUTO W/O SCOPE: CPT | Performed by: PHYSICIAN ASSISTANT

## 2020-09-16 NOTE — PROGRESS NOTES
Assessment/Plan:    No problem-specific Assessment & Plan notes found for this encounter  Diagnoses and all orders for this visit:    Urine frequency  -     POCT urine dip  -     GP Urine Culture  -     GP Ureaplasma By Multiplex PCR  -     GP Vaginitis/Vaginosis W/O PAP W/O HPV  Expanded  -     GP Cervicitis W/O PAP W/O HPV PLUS    Pain with urination  -     POCT urine dip  -     GP Urine Culture  -     GP Ureaplasma By Multiplex PCR  -     GP Vaginitis/Vaginosis W/O PAP W/O HPV  Expanded  -     GP Cervicitis W/O PAP W/O HPV PLUS    Acute cervicitis  -     GP Cervicitis W/O PAP W/O HPV PLUS    Screening examination for STD (sexually transmitted disease)  -     GP Cervicitis W/O PAP W/O HPV PLUS    Special screening examination for chlamydial disease  -     GP Cervicitis W/O PAP W/O HPV PLUS    Acute vaginitis  -     GP Vaginitis/Vaginosis W/O PAP W/O HPV  Expanded    Routine screening for STI (sexually transmitted infection)  -     GP Ureaplasma By Multiplex PCR          Subjective:      Patient ID: Chaneta Collet is a 63 y o  female  Pt presents for a problem today--  She c/o RLQ and LLQ aching  Pelvic pressure  Urinary frequency  Incomplete emptying  No vaginal sxs  No bleeding, itching burning, discharge    UA negative  US 9/19 normal  S/p ANTONI-BSO    IC discussed in detail      The following portions of the patient's history were reviewed and updated as appropriate: allergies, current medications, past family history, past medical history, past social history, past surgical history and problem list     Review of Systems   Constitutional: Negative for chills, fever and unexpected weight change  Gastrointestinal: Negative for abdominal pain, blood in stool, constipation and diarrhea  Genitourinary: Positive for dysuria, frequency, pelvic pain and urgency  Negative for flank pain, genital sores, hematuria, vaginal bleeding, vaginal discharge and vaginal pain           Objective:      /82 Ht 5' 5" (1 651 m)   Wt 104 kg (230 lb)   BMI 38 27 kg/m²          Physical Exam  Vitals signs and nursing note reviewed  Constitutional:       Appearance: She is well-developed  Genitourinary:     Exam position: Supine  Labia:         Right: No rash or lesion  Left: No rash or lesion  Vagina: Normal       Cervix: No cervical motion tenderness, discharge or friability  Lymphadenopathy:      Lower Body: No right inguinal adenopathy  No left inguinal adenopathy

## 2020-09-16 NOTE — PROGRESS NOTES
Patient is still having LLQ and RLQ pain  Patient also having urine frequency and urgency  Patient has no bleeding, itching or discharge

## 2020-09-18 LAB — BACTERIA UR CULT: NO GROWTH

## 2020-09-20 LAB
A VAGINAE DNA VAG NAA+PROBE-LOG#: ABNORMAL
A VAGINAE DNA VAG QL NAA+PROBE: DETECTED
BVAB2 DNA VAG QL NAA+PROBE: NOT DETECTED
C TRACH DNA SPEC QL PROBE+SIG AMP: NOT DETECTED
G VAGINALIS DNA SPEC QL NAA+PROBE: DETECTED
G VAGINALIS DNA VAG NAA+PROBE-LOG#: ABNORMAL
HSV1 DNA SPEC QL NAA+PROBE: NOT DETECTED
HSV2 DNA SPEC QL NAA+PROBE: NOT DETECTED
LACTOBACILLUS DNA VAG NAA+PROBE-LOG#: ABNORMAL
LACTOBACILLUS DNA VAG NAA+PROBE-LOG#: DETECTED
M GENITALIUM DNA SPEC QL NAA+PROBE: NOT DETECTED
MEGA1 DNA VAG QL NAA+PROBE: NOT DETECTED
N GONORRHOEA DNA SPEC QL NAA+PROBE: NOT DETECTED
SL AMB C.ALBICANS BY PCR: NOT DETECTED
SL AMB CANDIDA GENUS: NOT DETECTED
T VAGINALIS RRNA SPEC QL NAA+PROBE: NOT DETECTED
T VAGINALIS RRNA SPEC QL NAA+PROBE: NOT DETECTED
U UREALYTICUM DNA SPEC QL NAA+PROBE: NOT DETECTED

## 2020-09-21 DIAGNOSIS — B37.9 YEAST INFECTION: ICD-10-CM

## 2020-09-21 DIAGNOSIS — N76.0 BV (BACTERIAL VAGINOSIS): Primary | ICD-10-CM

## 2020-09-21 DIAGNOSIS — B96.89 BV (BACTERIAL VAGINOSIS): Primary | ICD-10-CM

## 2020-09-21 RX ORDER — FLUCONAZOLE 150 MG/1
150 TABLET ORAL ONCE
Qty: 1 TABLET | Refills: 0 | Status: SHIPPED | OUTPATIENT
Start: 2020-09-21 | End: 2020-09-21

## 2020-09-21 RX ORDER — METRONIDAZOLE 7.5 MG/G
1 GEL VAGINAL
Qty: 70 G | Refills: 0 | Status: SHIPPED | OUTPATIENT
Start: 2020-09-21 | End: 2020-09-26

## 2020-09-21 NOTE — TELEPHONE ENCOUNTER
The orders for metrogel and diflucan was put in  The patient wanted you to know that she will be seeing a urologist for her  pain on 9/23/20

## 2021-01-21 ENCOUNTER — ANNUAL EXAM (OUTPATIENT)
Dept: OBGYN CLINIC | Facility: CLINIC | Age: 66
End: 2021-01-21
Payer: COMMERCIAL

## 2021-01-21 VITALS
DIASTOLIC BLOOD PRESSURE: 80 MMHG | HEIGHT: 65 IN | WEIGHT: 228 LBS | BODY MASS INDEX: 37.99 KG/M2 | SYSTOLIC BLOOD PRESSURE: 120 MMHG

## 2021-01-21 DIAGNOSIS — Z01.419 ENCOUNTER FOR ANNUAL ROUTINE GYNECOLOGICAL EXAMINATION: ICD-10-CM

## 2021-01-21 DIAGNOSIS — Z12.31 ENCOUNTER FOR SCREENING MAMMOGRAM FOR BREAST CANCER: Primary | ICD-10-CM

## 2021-01-21 PROBLEM — Z90.710 HISTORY OF TOTAL HYSTERECTOMY WITH BILATERAL SALPINGO-OOPHORECTOMY (BSO): Status: ACTIVE | Noted: 2021-01-21

## 2021-01-21 PROBLEM — Z90.722 HISTORY OF TOTAL HYSTERECTOMY WITH BILATERAL SALPINGO-OOPHORECTOMY (BSO): Status: ACTIVE | Noted: 2021-01-21

## 2021-01-21 PROBLEM — Z90.79 HISTORY OF TOTAL HYSTERECTOMY WITH BILATERAL SALPINGO-OOPHORECTOMY (BSO): Status: ACTIVE | Noted: 2021-01-21

## 2021-01-21 PROBLEM — N30.10 INTERSTITIAL CYSTITIS: Status: ACTIVE | Noted: 2021-01-21

## 2021-01-21 PROBLEM — E13.9 DIABETES 1.5, MANAGED AS TYPE 2 (HCC): Status: ACTIVE | Noted: 2021-01-21

## 2021-01-21 PROCEDURE — S0612 ANNUAL GYNECOLOGICAL EXAMINA: HCPCS | Performed by: OBSTETRICS & GYNECOLOGY

## 2021-01-21 NOTE — PROGRESS NOTES
The patient is here for a yearly  She had a hysterectomy  The patient is having pelvic pain bilaterally  She had an ultrasound on 9/4/2019  The patient is not having any urinary symptoms  No bleeding  The patient says the pain started after having intercourse  She has a sharp pain during intercourse  No bowel or breast problems

## 2021-01-22 ENCOUNTER — TELEPHONE (OUTPATIENT)
Dept: GASTROENTEROLOGY | Facility: CLINIC | Age: 66
End: 2021-01-22

## 2021-01-22 DIAGNOSIS — K21.9 GASTROESOPHAGEAL REFLUX DISEASE WITHOUT ESOPHAGITIS: Primary | ICD-10-CM

## 2021-01-22 RX ORDER — OMEPRAZOLE 40 MG/1
40 CAPSULE, DELAYED RELEASE ORAL DAILY
Qty: 90 CAPSULE | Refills: 3 | Status: SHIPPED | OUTPATIENT
Start: 2021-01-22 | End: 2021-04-06 | Stop reason: SDUPTHER

## 2021-01-22 NOTE — TELEPHONE ENCOUNTER
Pt is scheduled for an aime  Soonest available appointment is April  Pt is asking for a refill on her omeprazole to get her thru until her appointment  Can this be done? Please let pt know  Thank you

## 2021-02-10 ENCOUNTER — TELEPHONE (OUTPATIENT)
Dept: GASTROENTEROLOGY | Facility: CLINIC | Age: 66
End: 2021-02-10

## 2021-02-10 NOTE — TELEPHONE ENCOUNTER
Called and spoke with pt  Informed her that there is nothing sooner with Dr Severo Cocks  She may have to reschedule however won't know until end of March  Told her to call as soon as she knows because his appts keep getting further and further out

## 2021-02-23 ENCOUNTER — HOSPITAL ENCOUNTER (OUTPATIENT)
Dept: MAMMOGRAPHY | Facility: HOSPITAL | Age: 66
Discharge: HOME | End: 2021-02-23
Payer: COMMERCIAL

## 2021-02-23 VITALS — WEIGHT: 230 LBS | BODY MASS INDEX: 38.32 KG/M2 | HEIGHT: 65 IN

## 2021-02-23 DIAGNOSIS — Z12.31 ENCOUNTER FOR SCREENING MAMMOGRAM FOR BREAST CANCER: ICD-10-CM

## 2021-02-23 PROCEDURE — 77063 BREAST TOMOSYNTHESIS BI: CPT

## 2021-02-23 PROCEDURE — 77067 SCR MAMMO BI INCL CAD: CPT

## 2021-03-10 DIAGNOSIS — Z23 ENCOUNTER FOR IMMUNIZATION: ICD-10-CM

## 2021-04-06 ENCOUNTER — OFFICE VISIT (OUTPATIENT)
Dept: GASTROENTEROLOGY | Facility: CLINIC | Age: 66
End: 2021-04-06
Payer: COMMERCIAL

## 2021-04-06 VITALS
DIASTOLIC BLOOD PRESSURE: 91 MMHG | BODY MASS INDEX: 37.49 KG/M2 | HEART RATE: 111 BPM | WEIGHT: 225 LBS | SYSTOLIC BLOOD PRESSURE: 128 MMHG | HEIGHT: 65 IN

## 2021-04-06 DIAGNOSIS — K22.70 BARRETT'S ESOPHAGUS WITHOUT DYSPLASIA: Primary | ICD-10-CM

## 2021-04-06 DIAGNOSIS — Z86.010 HX OF ADENOMATOUS COLONIC POLYPS: ICD-10-CM

## 2021-04-06 DIAGNOSIS — D50.8 OTHER IRON DEFICIENCY ANEMIA: ICD-10-CM

## 2021-04-06 DIAGNOSIS — K58.0 IRRITABLE BOWEL SYNDROME WITH DIARRHEA: ICD-10-CM

## 2021-04-06 DIAGNOSIS — K21.9 GASTROESOPHAGEAL REFLUX DISEASE WITHOUT ESOPHAGITIS: ICD-10-CM

## 2021-04-06 PROBLEM — K58.9 IRRITABLE BOWEL SYNDROME: Status: ACTIVE | Noted: 2021-04-06

## 2021-04-06 PROBLEM — K64.8 INTERNAL HEMORRHOIDS: Status: ACTIVE | Noted: 2021-04-06

## 2021-04-06 PROBLEM — E11.9 TYPE 2 DIABETES MELLITUS, WITHOUT LONG-TERM CURRENT USE OF INSULIN (HCC): Status: ACTIVE | Noted: 2019-12-13

## 2021-04-06 PROBLEM — D50.9 IRON DEFICIENCY ANEMIA: Status: ACTIVE | Noted: 2021-04-06

## 2021-04-06 PROBLEM — Z86.0101 HX OF ADENOMATOUS COLONIC POLYPS: Status: ACTIVE | Noted: 2021-04-06

## 2021-04-06 PROBLEM — F51.04 CHRONIC INSOMNIA: Status: ACTIVE | Noted: 2020-01-30

## 2021-04-06 PROCEDURE — 99213 OFFICE O/P EST LOW 20 MIN: CPT | Performed by: NURSE PRACTITIONER

## 2021-04-06 RX ORDER — OMEPRAZOLE 40 MG/1
40 CAPSULE, DELAYED RELEASE ORAL DAILY
Qty: 90 CAPSULE | Refills: 3 | Status: SHIPPED | OUTPATIENT
Start: 2021-04-06 | End: 2022-03-04

## 2021-04-06 NOTE — PROGRESS NOTES
[facility] - Outpatient Follow-up Note  Mariama Bowden 63 y o  female MRN: [id number]  Encounter: [id number]          ASSESSMENT AND PLAN:      1  James's esophagus without dysplasia  James's esophagus seen on last EGD done 9/2019  Patient due for repeat EGD 09/2022, patient placed on recall list for EGD  Patient denies any upper GI symptoms  2  Irritable bowel syndrome with diarrhea  Patient denies diarrhea  Patient reports irritable bowel syndrome with diarrhea well controlled since she started taking iron supplement she has had no further diarrhea and denies any constipation  Further evaluation of irritable bowel syndrome will be done at time of colonoscopy due 09/2022     3  Other iron deficiency anemia  Anemia well controlled  Hemoglobin 13 8 on 10/2020  Denies any signs of GI bleed  Continue iron supplement as ordered  4  Hx of adenomatous colonic polyps  Colonoscopy up-to-date  Last colonoscopy done 09/2019 which showed colon polyps, internal hemorrhoids, and early diverticulosis  Further evaluation of colonic polyps will be done at time of repeat colonoscopy due 09/2022, patient placed on recall list for colonoscopy  Patient currently denies any lower GI symptoms  5  Gastroesophageal reflux disease without esophagitis  Patient reports GERD is well controlled on current medication of omeprazole 40 mg daily   -continue current medication regimen of omeprazole (PriLOSEC) 40 MG capsule; Take 1 capsule (40 mg total) by mouth daily  Further evaluation of GERD will be done at time of repeat EGD due 09/2022  Follow-up in 1 year     ______________________________________________________________________    SUBJECTIVE:    Danielito Beebe is a 71-year-old female who presents to office for follow-up for GERD, history of colon polyps, family history of colon cancer paternal, IBS diarrhea, and iron deficiency anemia  Patient currently denies any GI symptoms    Patient denies acid reflux, heartburn, nausea, vomiting, dysphagia, epigastric or abdominal pain  Denies blood in stool, blood from rectal area, or black tarry stool  No fever or chills  No unintentional weight loss  Patient denies any diarrhea or constipation  Reports bowel patterns are regular since taking iron daily  Patient reports anemia is well controlled  Last hemoglobin 13  8  on 10/01/2020  Patient does not smoke  Patient does drink alcohol socially  Patient is up-to-date on EGD and colonoscopy  Next EGD and colonoscopy due 2022  Last EGD and colonoscopy done 2019  EGD and colonoscopy showed James's esophagus, colon polyps, internal hemorrhoids, in early diverticulosis  Biopsy showed James's esophagus  Proximal ascending colon polyp fragmented adenomatous polyp in descending colon polyp hyperplastic polyp  Positive family history colon cancer father  REVIEW OF SYSTEMS IS OTHERWISE NEGATIVE  Historical Information   History reviewed  No pertinent past medical history    Past Surgical History:   Procedure Laterality Date    BREAST BIOPSY Left 2016     SECTION      HYSTERECTOMY  1991    OOPHORECTOMY  1991    REDUCTION MAMMAPLASTY Bilateral     US GUIDED BREAST BIOPSY LEFT COMPLETE Left 2016     Social History   Social History     Substance and Sexual Activity   Alcohol Use Yes    Alcohol/week: 3 0 standard drinks    Types: 3 Glasses of wine per week    Frequency: Monthly or less    Drinks per session: 1 or 2    Comment: social     Social History     Substance and Sexual Activity   Drug Use Never     Social History     Tobacco Use   Smoking Status Former Smoker    Quit date: 12    Years since quittin 2   Smokeless Tobacco Never Used     Family History   Problem Relation Age of Onset    Breast cancer Maternal Aunt 79    Diabetes Mother     Breast cancer Mother 62    Colon cancer Father 54    Diabetes Father     Diabetes Brother     Diabetes Brother     No Known Problems Daughter     No Known Problems Maternal Grandmother     No Known Problems Paternal Grandmother     No Known Problems Paternal Aunt     No Known Problems Paternal Aunt     No Known Problems Paternal Aunt        Meds/Allergies       Current Outpatient Medications:     albuterol (PROVENTIL HFA,VENTOLIN HFA) 90 mcg/act inhaler    aspirin 81 MG tablet    atorvastatin (LIPITOR) 20 mg tablet    calcium carbonate (TUMS EX) 750 mg chewable tablet    estradiol (ESTRACE) 1 mg tablet    flunisolide (NASALIDE) 25 MCG/ACT (0 025%) SOLN    glucose blood (FREESTYLE TEST STRIPS) test strip    hydrOXYzine HCL (ATARAX) 10 mg tablet    Latanoprost 0 005 % EMUL    LORazepam (ATIVAN) 0 5 mg tablet    metFORMIN (GLUCOPHAGE-XR) 500 mg 24 hr tablet    omeprazole (PriLOSEC) 40 MG capsule    flunisolide (NASALIDE) 25 MCG/ACT (0 025%) SOLN    Allergies   Allergen Reactions    Benzedrex [Propylhexedrine] Itching    Wound Dressing Adhesive Rash           Objective     Blood pressure 128/91, pulse (!) 111, height 5' 5" (1 651 m), weight 102 kg (225 lb)  Body mass index is 37 44 kg/m²  PHYSICAL EXAM:      General Appearance:   Alert, cooperative, no distress   HEENT:   Normocephalic, atraumatic, anicteric      Neck:  Supple, symmetrical, trachea midline   Lungs:   Clear to auscultation bilaterally; no rales, rhonchi or wheezing; respirations unlabored    Heart[de-identified]   Regular rate and rhythm; no murmur, rub, or gallop  Abdomen:   Soft, non-tender, non-distended; normal bowel sounds; no masses, no organomegaly    Genitalia:   Deferred    Rectal:   Deferred    Extremities:  No cyanosis, clubbing or edema    Pulses:  2+ and symmetric    Skin:  No jaundice, rashes, or lesions    Lymph nodes:  No palpable cervical lymphadenopathy        Lab Results:   No visits with results within 1 Day(s) from this visit     Latest known visit with results is:   Orders Only on 09/16/2020   Component Date Value    CHLAMYD  TRACH BY MULTIP 09/16/2020 Not Detected     GC BY MULTIPLEX PCR 09/16/2020 Not Detected     MYCOPLASMA GENITALIUM MU* 09/16/2020 Not Detected     Trichomonas By Mult PCR 09/16/2020 Not Detected     HSV 1 BY RT-PCR 09/16/2020 Not Detected     HSV 2 BY RT-PCR 09/16/2020 Not Detected          Radiology Results:   No results found

## 2021-07-29 ENCOUNTER — OFFICE VISIT (OUTPATIENT)
Dept: OBGYN CLINIC | Facility: CLINIC | Age: 66
End: 2021-07-29
Payer: COMMERCIAL

## 2021-07-29 VITALS
DIASTOLIC BLOOD PRESSURE: 84 MMHG | WEIGHT: 234 LBS | HEIGHT: 65 IN | BODY MASS INDEX: 38.99 KG/M2 | SYSTOLIC BLOOD PRESSURE: 124 MMHG

## 2021-07-29 DIAGNOSIS — E78.5 DYSLIPIDEMIA ASSOCIATED WITH TYPE 2 DIABETES MELLITUS (HCC): ICD-10-CM

## 2021-07-29 DIAGNOSIS — B37.49 GENITAL CANDIDIASIS: ICD-10-CM

## 2021-07-29 DIAGNOSIS — N89.8 VAGINAL ITCHING: Primary | ICD-10-CM

## 2021-07-29 DIAGNOSIS — N76.0 ACUTE VAGINITIS: ICD-10-CM

## 2021-07-29 DIAGNOSIS — E11.69 DYSLIPIDEMIA ASSOCIATED WITH TYPE 2 DIABETES MELLITUS (HCC): ICD-10-CM

## 2021-07-29 PROCEDURE — 99213 OFFICE O/P EST LOW 20 MIN: CPT | Performed by: PHYSICIAN ASSISTANT

## 2021-07-29 RX ORDER — FLUCONAZOLE 150 MG/1
150 TABLET ORAL ONCE
Qty: 1 TABLET | Refills: 1 | Status: SHIPPED | OUTPATIENT
Start: 2021-07-29 | End: 2021-07-29

## 2021-07-29 RX ORDER — METRONIDAZOLE 500 MG/1
500 TABLET ORAL EVERY 8 HOURS SCHEDULED
COMMUNITY

## 2021-07-29 NOTE — PROGRESS NOTES
Patient presents with external vaginal itching  Patient is currently seeing a uro/gyn for bladder spasms, and just finished Flagyl  She is also doing physical therapy      9/16/20 +BV-Treated with metrogel

## 2021-08-02 NOTE — PROGRESS NOTES
Assessment/Plan:    No problem-specific Assessment & Plan notes found for this encounter  Diagnoses and all orders for this visit:    Vaginal itching  -     GP Vaginitis/Vaginosis W/O PAP W/O HPV  Expanded  -     fluconazole (DIFLUCAN) 150 mg tablet; Take 1 tablet (150 mg total) by mouth once for 1 dose  -     terconazole (TERAZOL 7) 0 4 % vaginal cream; Insert 1 applicator into the vagina daily at bedtime    Acute vaginitis  -     GP Vaginitis/Vaginosis W/O PAP W/O HPV  Expanded  -     fluconazole (DIFLUCAN) 150 mg tablet; Take 1 tablet (150 mg total) by mouth once for 1 dose  -     terconazole (TERAZOL 7) 0 4 % vaginal cream; Insert 1 applicator into the vagina daily at bedtime    Genital candidiasis  -     GP Vaginitis/Vaginosis W/O PAP W/O HPV  Expanded    Dyslipidemia associated with type 2 diabetes mellitus ([facility] [address] )    Other orders  -     metroNIDAZOLE (FLAGYL) 500 mg tablet; Take 500 mg by mouth every 8 (eight) hours          Subjective:      Patient ID: Anamaria Rodriguez is a 63 y o  female  Pt presents for a problem today--  She states that she has vaginal itching off and on x few weeks  Has been having bladder issues and is seeing uro/PT  S/p ABX  Itching started shortly after that  No bleeding  No pelvic pain  ? Bump  No burning, discharge or odor   just itch    Cultures done  rx diflucan and terzol  Change products  Hydrate  Witch hazel      The following portions of the patient's history were reviewed and updated as appropriate: allergies, current medications, past family history, past medical history, past social history, past surgical history and problem list     Review of Systems   Constitutional: Negative for chills, fever and unexpected weight change  Gastrointestinal: Negative for abdominal pain, blood in stool, constipation and diarrhea  Genitourinary: Positive for vaginal pain (itching)  Negative for dysuria, frequency, vaginal bleeding and vaginal discharge  Objective:      /84   Ht 5' 5" (1 651 m)   Wt 106 kg (234 lb)   BMI 38 94 kg/m²          Physical Exam  Vitals and nursing note reviewed  Constitutional:       Appearance: She is well-developed  Genitourinary:     Exam position: Supine  Labia:         Right: No rash or lesion  Left: No rash or lesion  Vagina: Normal       Cervix: No cervical motion tenderness, discharge or friability  Comments: +erythema  Lymphadenopathy:      Lower Body: No right inguinal adenopathy  No left inguinal adenopathy

## 2021-08-03 DIAGNOSIS — B96.89 BV (BACTERIAL VAGINOSIS): Primary | ICD-10-CM

## 2021-08-03 DIAGNOSIS — N76.0 BV (BACTERIAL VAGINOSIS): Primary | ICD-10-CM

## 2021-08-03 RX ORDER — METRONIDAZOLE 500 MG/1
500 TABLET ORAL EVERY 12 HOURS SCHEDULED
Qty: 14 TABLET | Refills: 0 | Status: SHIPPED | OUTPATIENT
Start: 2021-08-03 | End: 2021-08-10

## 2021-08-28 NOTE — PROGRESS NOTES
Assessment/Plan:  Normal breast and gyn exam   Status post LAVH BSO, 1991  Interstitial cystitis  Family history of colon and breast cancer  Normal colonoscopy September 2019  Received flu vaccine for 2020    Plan:  Rx mammogram   Continue 10 you healthy diet exercise  Continue follow-up with your gone for interstitial cystitis  Continue vitamin-C vitamin-D and zinc   Recommend COVID 19 vaccine  Subjective:      Patient ID: Jory Lewis is a 63 y o  female presents for yearly exam with no complaints except for bladder pain  Patient was diagnosed with interstitial cystitis is followed by urogynecologist at [facility] and has had 6 bladder installations  Patient's symptoms are still there and was told if they continue she should see a pain specialist   Patient has not made that appointment yet  Patient denies any pelvic pain or vaginal bleeding  Denies any breast or bowel problems  No change in family history  Medications reviewed  Review of Systems   Constitutional: Negative  Negative for fatigue, fever and unexpected weight change  HENT: Negative  Eyes: Negative  Respiratory: Negative  Negative for chest tightness, shortness of breath, wheezing and stridor  Cardiovascular: Negative  Negative for chest pain, palpitations and leg swelling  Gastrointestinal: Negative  Negative for abdominal pain, blood in stool, diarrhea, nausea, rectal pain and vomiting  Endocrine: Negative  Genitourinary: Negative for dysuria, frequency, vaginal bleeding, vaginal discharge and vaginal pain  Bladder pain   Musculoskeletal: Negative  Skin: Negative  Allergic/Immunologic: Negative  Neurological: Negative  Hematological: Negative  Psychiatric/Behavioral: Negative  All other systems reviewed and are negative          Objective:      /80 (BP Location: Left arm, Patient Position: Sitting, Cuff Size: Standard)   Ht 5' 4 57" (1 64 m)   Wt 103 kg (228 lb) BMI 38 45 kg/m²          Physical Exam  Constitutional:       Appearance: She is well-developed  Cardiovascular:      Rate and Rhythm: Normal rate and regular rhythm  Heart sounds: Normal heart sounds  Pulmonary:      Effort: Pulmonary effort is normal  No respiratory distress  Breath sounds: No stridor  No wheezing or rales  Chest:      Chest wall: No tenderness  Breasts: Breasts are symmetrical          Right: No inverted nipple, mass, nipple discharge, skin change or tenderness  Left: No inverted nipple, mass, nipple discharge, skin change or tenderness  Abdominal:      General: Bowel sounds are normal  There is no distension  Palpations: Abdomen is soft  There is no mass  Tenderness: There is no abdominal tenderness  There is no guarding or rebound  Hernia: No hernia is present  There is no hernia in the left inguinal area  Genitourinary:     Labia:         Right: No rash, tenderness, lesion or injury  Left: No rash, tenderness, lesion or injury  Vagina: Normal  No signs of injury and foreign body  No vaginal discharge, erythema, tenderness or bleeding  Adnexa:         Right: No mass, tenderness or fullness  Left: No mass, tenderness or fullness  Rectum: No mass, tenderness, anal fissure, external hemorrhoid or internal hemorrhoid  Normal anal tone  Comments: Urethral meatus normal   Cervix uterus tubes and ovaries absent  Vaginal cuff well supported  No cystocele or rectocele  Lymphadenopathy:      Lower Body: No right inguinal adenopathy  No left inguinal adenopathy  Psychiatric:         Behavior: Behavior normal          Thought Content:  Thought content normal          Judgment: Judgment normal  [Negative] : Heme/Lymph

## 2022-03-04 DIAGNOSIS — K21.9 GASTROESOPHAGEAL REFLUX DISEASE WITHOUT ESOPHAGITIS: ICD-10-CM

## 2022-03-04 RX ORDER — OMEPRAZOLE 40 MG/1
CAPSULE, DELAYED RELEASE ORAL
Qty: 90 CAPSULE | Refills: 3 | Status: SHIPPED | OUTPATIENT
Start: 2022-03-04 | End: 2022-03-05 | Stop reason: SDUPTHER

## 2022-03-05 RX ORDER — OMEPRAZOLE 40 MG/1
40 CAPSULE, DELAYED RELEASE ORAL DAILY
Qty: 90 CAPSULE | Refills: 3 | Status: SHIPPED | OUTPATIENT
Start: 2022-03-05

## 2022-03-22 ENCOUNTER — TELEPHONE (OUTPATIENT)
Dept: GASTROENTEROLOGY | Facility: CLINIC | Age: 67
End: 2022-03-22

## 2022-03-22 NOTE — TELEPHONE ENCOUNTER
Received call from pt informing that she received a recall letter in the mail that she was due for her EGD/Colon  Pt is not due until 9/2022 for both procedures with Dr Rama Rojas for hx of James's/GERD w/ Esophagitis/Iron def  Anemia/hx of tubular adenoma polyps/IBS w/ diarrhea    I called and spoke to pt and informed her of this and informed that I will call her back to schedule once schedule is open for Sept

## 2022-06-20 ENCOUNTER — TELEPHONE (OUTPATIENT)
Dept: GASTROENTEROLOGY | Facility: CLINIC | Age: 67
End: 2022-06-20

## 2022-06-20 NOTE — TELEPHONE ENCOUNTER
Scheduled date of EGD/colonoscopy (as of today): 9/6/22  Physician performing EGD/colonoscopy: Dr Camargo Clarity   Location of EGD/colonoscopy: [facility]  Desired bowel prep reviewed with patient: miralax w/ dul  Instructions reviewed with patient by:dionte  Clearances:  N/a

## 2022-06-20 NOTE — TELEPHONE ENCOUNTER
[facility] Assessment    Name: Miah Blair  YOB: 1955  Last Height: 5' 5" (1 651 m)  Last weight: 106 kg (234 lb)  BMI: 38 94 kg/m²  Procedure: Colon/EGD  Diagnosis: see orders  Date of procedure: 9/6/22  Prep: miralax w/ dul  Responsible : yes  Phone#: [phone]  Name completing form: Meena Fontana  Date form completed: 06/20/22      If the patient answers yes to any of these questions, schedule in a hospital  Are you pregnant: No  Do you rely on a wheelchair for mobility: No  Have you been diagnosed with End Stage Renal Disease (ESRD): No  Do you need oxygen during the day: No  Have you had a heart attack or stroke within the past three months: No  Have you had a seizure within the past three months: No  Have you ever been informed by anesthesia that you have a difficult airway: No  Additional Questions  Have you had any cardiac testing or are under the care of a Cardiologist (see cardiac list): No  Cardiac list:   Do you have an implanted cardiac defibrillator: No (Comment:  This patient should be scheduled in the hospital)    Have any bleeding problems, such as anemia or hemophilia (If patient has H&H result below 8, schedule in hospital   H&H must be within 30 days of procedure): No    Had an organ transplant within the past 3 months: No    Do you have any present infections: No  Do you get short of breath when walking a few blocks: No  Have you been diagnosed with diabetes: Yes  Comments (provide cardiac provider information if applicable):

## 2022-08-23 RX ORDER — SODIUM CHLORIDE, SODIUM LACTATE, POTASSIUM CHLORIDE, CALCIUM CHLORIDE 600; 310; 30; 20 MG/100ML; MG/100ML; MG/100ML; MG/100ML
125 INJECTION, SOLUTION INTRAVENOUS CONTINUOUS
Status: CANCELLED | OUTPATIENT
Start: 2022-08-23

## 2022-08-30 ENCOUNTER — TELEPHONE (OUTPATIENT)
Dept: GASTROENTEROLOGY | Facility: CLINIC | Age: 67
End: 2022-08-30

## 2022-08-30 NOTE — TELEPHONE ENCOUNTER
Patient is returning a call from [employer] regarding she did not receive Procedure Instructions

## 2022-08-30 NOTE — TELEPHONE ENCOUNTER
I lmom confirming pt's colonoscopy/egd scheduled on 9/6/22 with Dr Tiffanie Prakash at [facility]  Informed [facility] would be calling 1-2 days prior with arrival time  Informed would need to do a clear liquid diet the day before as well as the bowel cleansing preparation  Informed pt would need a  the day of the procedure due to being under sedation  I asked pt to please call back if has not received instructions or if has any questions  Advised pt to contact insurance if has any questions regarding coverage of procedure

## 2022-08-31 ENCOUNTER — NURSE TRIAGE (OUTPATIENT)
Dept: OTHER | Facility: OTHER | Age: 67
End: 2022-08-31

## 2022-08-31 NOTE — TELEPHONE ENCOUNTER
Regarding: Colonoscopy prep instructions   ----- Message from Savannah Victoria RN sent at 8/31/2022  2:43 PM EDT -----  " I would like to review my prep instructions for my colonoscopy "

## 2022-08-31 NOTE — TELEPHONE ENCOUNTER
Patient called in to review bowel prep instructions for Miralax/Dulcolax  Patient verbalized understanding  Reason for Disposition   Bowel prep for colonoscopy, questions about    Answer Assessment - Initial Assessment Questions  1  DATE/TIME: "When did you have your colonoscopy?"       Scheduled 9/6/22    2   MAIN CONCERN: "What is your main concern right now?" "What questions do you have?"      Bowel prep instructions reviewed    Protocols used: COLONOSCOPY SYMPTOMS AND QUESTIONS-ADULT-AH

## 2022-09-06 ENCOUNTER — ANESTHESIA EVENT (OUTPATIENT)
Dept: GASTROENTEROLOGY | Facility: AMBULATORY SURGERY CENTER | Age: 67
End: 2022-09-06

## 2022-09-06 ENCOUNTER — ANESTHESIA (OUTPATIENT)
Dept: GASTROENTEROLOGY | Facility: AMBULATORY SURGERY CENTER | Age: 67
End: 2022-09-06

## 2022-09-06 ENCOUNTER — HOSPITAL ENCOUNTER (OUTPATIENT)
Dept: GASTROENTEROLOGY | Facility: AMBULATORY SURGERY CENTER | Age: 67
Discharge: HOME | End: 2022-09-06
Payer: MEDICARE

## 2022-09-06 VITALS
TEMPERATURE: 97.4 F | OXYGEN SATURATION: 98 % | RESPIRATION RATE: 18 BRPM | DIASTOLIC BLOOD PRESSURE: 75 MMHG | WEIGHT: 220 LBS | HEIGHT: 65 IN | HEART RATE: 82 BPM | BODY MASS INDEX: 36.65 KG/M2 | SYSTOLIC BLOOD PRESSURE: 141 MMHG

## 2022-09-06 DIAGNOSIS — K22.70 BARRETT'S ESOPHAGUS WITHOUT DYSPLASIA: ICD-10-CM

## 2022-09-06 DIAGNOSIS — K21.00 GASTROESOPHAGEAL REFLUX DISEASE WITH ESOPHAGITIS, UNSPECIFIED WHETHER HEMORRHAGE: ICD-10-CM

## 2022-09-06 DIAGNOSIS — Z86.010 HX OF ADENOMATOUS COLONIC POLYPS: ICD-10-CM

## 2022-09-06 DIAGNOSIS — K58.0 IRRITABLE BOWEL SYNDROME WITH DIARRHEA: ICD-10-CM

## 2022-09-06 PROBLEM — M25.562 KNEE MENISCUS PAIN, LEFT: Status: ACTIVE | Noted: 2021-08-27

## 2022-09-06 PROCEDURE — 45385 COLONOSCOPY W/LESION REMOVAL: CPT | Performed by: INTERNAL MEDICINE

## 2022-09-06 PROCEDURE — 43239 EGD BIOPSY SINGLE/MULTIPLE: CPT | Performed by: INTERNAL MEDICINE

## 2022-09-06 PROCEDURE — 88305 TISSUE EXAM BY PATHOLOGIST: CPT | Performed by: PATHOLOGY

## 2022-09-06 RX ORDER — GLIMEPIRIDE 2 MG/1
2 TABLET ORAL DAILY
COMMUNITY
Start: 2022-08-11

## 2022-09-06 RX ORDER — FEXOFENADINE HYDROCHLORIDE 180 MG/1
TABLET, FILM COATED ORAL
COMMUNITY
Start: 2022-09-01

## 2022-09-06 RX ORDER — PROPOFOL 10 MG/ML
INJECTION, EMULSION INTRAVENOUS AS NEEDED
Status: DISCONTINUED | OUTPATIENT
Start: 2022-09-06 | End: 2022-09-06

## 2022-09-06 RX ORDER — SODIUM CHLORIDE, SODIUM LACTATE, POTASSIUM CHLORIDE, CALCIUM CHLORIDE 600; 310; 30; 20 MG/100ML; MG/100ML; MG/100ML; MG/100ML
INJECTION, SOLUTION INTRAVENOUS CONTINUOUS PRN
Status: DISCONTINUED | OUTPATIENT
Start: 2022-09-06 | End: 2022-09-06

## 2022-09-06 RX ORDER — LIDOCAINE HYDROCHLORIDE 20 MG/ML
INJECTION, SOLUTION EPIDURAL; INFILTRATION; INTRACAUDAL; PERINEURAL AS NEEDED
Status: DISCONTINUED | OUTPATIENT
Start: 2022-09-06 | End: 2022-09-06

## 2022-09-06 RX ORDER — SODIUM CHLORIDE, SODIUM LACTATE, POTASSIUM CHLORIDE, CALCIUM CHLORIDE 600; 310; 30; 20 MG/100ML; MG/100ML; MG/100ML; MG/100ML
125 INJECTION, SOLUTION INTRAVENOUS CONTINUOUS
Status: DISCONTINUED | OUTPATIENT
Start: 2022-09-06 | End: 2022-09-10 | Stop reason: HOSPADM

## 2022-09-06 RX ADMIN — PROPOFOL 50 MG: 10 INJECTION, EMULSION INTRAVENOUS at 08:11

## 2022-09-06 RX ADMIN — PROPOFOL 50 MG: 10 INJECTION, EMULSION INTRAVENOUS at 08:01

## 2022-09-06 RX ADMIN — PROPOFOL 100 MG: 10 INJECTION, EMULSION INTRAVENOUS at 07:43

## 2022-09-06 RX ADMIN — LIDOCAINE HYDROCHLORIDE 100 MG: 20 INJECTION, SOLUTION EPIDURAL; INFILTRATION; INTRACAUDAL; PERINEURAL at 07:37

## 2022-09-06 RX ADMIN — PROPOFOL 100 MG: 10 INJECTION, EMULSION INTRAVENOUS at 07:41

## 2022-09-06 RX ADMIN — PROPOFOL 50 MG: 10 INJECTION, EMULSION INTRAVENOUS at 07:55

## 2022-09-06 RX ADMIN — PROPOFOL 50 MG: 10 INJECTION, EMULSION INTRAVENOUS at 08:06

## 2022-09-06 RX ADMIN — PROPOFOL 50 MG: 10 INJECTION, EMULSION INTRAVENOUS at 07:47

## 2022-09-06 RX ADMIN — PROPOFOL 50 MG: 10 INJECTION, EMULSION INTRAVENOUS at 07:51

## 2022-09-06 RX ADMIN — SODIUM CHLORIDE, SODIUM LACTATE, POTASSIUM CHLORIDE, CALCIUM CHLORIDE: 600; 310; 30; 20 INJECTION, SOLUTION INTRAVENOUS at 07:28

## 2022-09-06 RX ADMIN — PROPOFOL 200 MG: 10 INJECTION, EMULSION INTRAVENOUS at 07:37

## 2022-09-06 NOTE — ANESTHESIA POSTPROCEDURE EVALUATION
Post-Op Assessment Note    CV Status:  Stable  Pain Score: 0    Pain management: adequate     Mental Status:  Arousable and sleepy   Hydration Status:  Stable   PONV Controlled:  Controlled   Airway Patency:  Patent      Post Op Vitals Reviewed: Yes      Staff: CRNA         No complications documented      BP   138/72   Temp 97 6   Pulse 68   Resp 14   SpO2 99

## 2022-09-06 NOTE — ANESTHESIA PREPROCEDURE EVALUATION
Procedure:  COLONOSCOPY  EGD    Relevant Problems   CARDIO   (+) Internal hemorrhoids      ENDO   (+) Diabetes 1 5, managed as type 2 ([address] )   (+) Dyslipidemia associated with type 2 diabetes mellitus (HCC)   (+) Type 2 diabetes mellitus, without long-term current use of insulin (HCC)      GI/HEPATIC   (+) Gastroesophageal reflux disease without esophagitis      GYN   (+) History of total hysterectomy with bilateral salpingo-oophorectomy (BSO)      HEMATOLOGY   (+) Iron deficiency anemia      NEURO/PSYCH   (+) Generalized anxiety disorder   (+) H/O psoriasis   (+) Hx of adenomatous colonic polyps      PULMONARY   (+) Mild intermittent asthma      Digestive   (+) James's esophagus without dysplasia   (+) Irritable bowel syndrome      Genitourinary   (+) Interstitial cystitis      Other   (+) Chronic insomnia   (+) Diverticulosis of large intestine without hemorrhage   (+) Knee meniscus pain, left      Lab Results   Component Value Date    SODIUM 138 11/26/2017    K 3 9 11/26/2017     11/26/2017    CO2 28 11/26/2017    AGAP 8 11/26/2017    BUN 12 11/26/2017    CREATININE 0 89 11/26/2017    GLUC 212 (H) 11/26/2017    CALCIUM 9 1 11/26/2017    EGFR 71 11/26/2017     Lab Results   Component Value Date    WBC 6 73 11/26/2017    HGB 10 8 (L) 11/26/2017    HCT 35 1 11/26/2017    MCV 78 (L) 11/26/2017     11/26/2017       Physical Exam    Airway    Mallampati score: II  TM Distance: >3 FB  Neck ROM: full     Dental   No notable dental hx     Cardiovascular      Pulmonary      Other Findings        Anesthesia Plan  ASA Score- 2     Anesthesia Type- IV sedation with anesthesia with ASA Monitors  Additional Monitors:   Airway Plan:           Plan Factors-Exercise tolerance (METS): >4 METS  Chart reviewed  EKG reviewed  Imaging results reviewed  Existing labs reviewed  Patient summary reviewed              Induction-     Postoperative Plan-     Informed Consent- Anesthetic plan and risks discussed with patient  I personally reviewed this patient with the CRNA  Discussed and agreed on the Anesthesia Plan with the ROSALINE Marrero

## 2022-09-06 NOTE — H&P
History and Physical - [facility]  Ricardo Koehler 74 y o  female MRN: [id number]                  HPI: Ricardo Koehler is a 74y o  year old female who presents for EGD and colonoscopy due to history of Barretts esophagus, reflux, pill dysphagia, history of adenomatous colon polyps  Distant history of tTG borderline with some increased lymphocytes  She does carry the DQ 2 HLA      REVIEW OF SYSTEMS: Per the HPI, and otherwise unremarkable      Historical Information   Past Medical History:   Diagnosis Date    Anxiety     Asthma     asthma related, used inhaler 1 week ago    James's esophagus     Colon polyp     Diabetes mellitus ([address] )     NIDDM    Dysphagia     1 month ago started    GERD (gastroesophageal reflux disease)     Glaucoma     Hyperlipidemia      Past Surgical History:   Procedure Laterality Date    ABDOMINOPLASTY      also 2 abdominal hernia repairs at same time    BREAST BIOPSY Left 2016     SECTION      COLONOSCOPY      HYSTERECTOMY  1991    OOPHORECTOMY  1991    REDUCTION MAMMAPLASTY Bilateral     UPPER GASTROINTESTINAL ENDOSCOPY      US GUIDED BREAST BIOPSY LEFT COMPLETE Left 2016     Social History   Social History     Substance and Sexual Activity   Alcohol Use Yes    Alcohol/week: 3 0 standard drinks    Types: 3 Glasses of wine per week    Comment: social, 2 x month     Social History     Substance and Sexual Activity   Drug Use Never     Social History     Tobacco Use   Smoking Status Former Smoker    Quit date: 12    Years since quittin 7   Smokeless Tobacco Never Used   Tobacco Comment    quit 20 years ago     Family History   Problem Relation Age of Onset   Marisol Potts Breast cancer Maternal Aunt 79    Diabetes Mother     Breast cancer Mother 62    Colon cancer Father 54    Diabetes Father     Diabetes Brother     Diabetes Brother     No Known Problems Daughter     No Known Problems Maternal Grandmother     No Known Problems Paternal Grandmother     No Known Problems Paternal Aunt     No Known Problems Paternal Aunt     No Known Problems Paternal Aunt        Meds/Allergies       Current Outpatient Medications:     albuterol (PROVENTIL HFA,VENTOLIN HFA) 90 mcg/act inhaler    Allergy Relief 180 MG tablet    aspirin 81 MG tablet    atorvastatin (LIPITOR) 20 mg tablet    calcium carbonate (TUMS EX) 750 mg chewable tablet    estradiol (ESTRACE) 1 mg tablet    flunisolide (NASALIDE) 25 MCG/ACT (0 025%) SOLN    glimepiride (AMARYL) 2 mg tablet    Latanoprost 0 005 % EMUL    LORazepam (ATIVAN) 0 5 mg tablet    metFORMIN (GLUCOPHAGE-XR) 500 mg 24 hr tablet    omeprazole (PriLOSEC) 40 MG capsule    glucose blood (FREESTYLE TEST STRIPS) test strip    Allergies   Allergen Reactions    Benzedrex [Propylhexedrine] Itching    Wound Dressing Adhesive Rash       Objective     There were no vitals taken for this visit  PHYSICAL EXAM    Gen: NAD  Head: NCAT  CV: RRR  CHEST: Clear  ABD: soft, NT/ND  EXT: no edema      ASSESSMENT/PLAN:  This is a 74y o  year old female here for EGD and colonoscopy, and she is stable and optimized for her procedure

## 2022-09-14 NOTE — RESULT ENCOUNTER NOTE
Please inform patient that their biopsies were benign    EGD 3 years due to history of Barretts although none present on recent biopsies, 2 tubular adenomas repeat colonoscopy 7 years

## 2022-09-23 ENCOUNTER — OFFICE VISIT (OUTPATIENT)
Dept: GASTROENTEROLOGY | Facility: CLINIC | Age: 67
End: 2022-09-23
Payer: MEDICARE

## 2022-09-23 VITALS
HEART RATE: 89 BPM | HEIGHT: 65 IN | SYSTOLIC BLOOD PRESSURE: 132 MMHG | WEIGHT: 244 LBS | DIASTOLIC BLOOD PRESSURE: 90 MMHG | BODY MASS INDEX: 40.65 KG/M2

## 2022-09-23 DIAGNOSIS — Z86.010 HX OF ADENOMATOUS COLONIC POLYPS: Primary | ICD-10-CM

## 2022-09-23 DIAGNOSIS — K22.70 BARRETT'S ESOPHAGUS WITHOUT DYSPLASIA: ICD-10-CM

## 2022-09-23 DIAGNOSIS — R13.10 PILL DYSPHAGIA: ICD-10-CM

## 2022-09-23 DIAGNOSIS — K21.00 GASTROESOPHAGEAL REFLUX DISEASE WITH ESOPHAGITIS, UNSPECIFIED WHETHER HEMORRHAGE: ICD-10-CM

## 2022-09-23 PROCEDURE — 99213 OFFICE O/P EST LOW 20 MIN: CPT | Performed by: PHYSICIAN ASSISTANT

## 2022-09-23 RX ORDER — LATANOPROST 50 UG/ML
SOLUTION/ DROPS OPHTHALMIC
COMMUNITY
Start: 2022-08-31

## 2022-09-23 RX ORDER — LEVOCETIRIZINE DIHYDROCHLORIDE 5 MG/1
TABLET, FILM COATED ORAL
COMMUNITY
Start: 2022-09-22

## 2022-09-23 RX ORDER — FEXOFENADINE HCL 180 MG/1
180 TABLET ORAL
COMMUNITY
Start: 2022-08-14

## 2022-09-23 RX ORDER — SCOLOPAMINE TRANSDERMAL SYSTEM 1 MG/1
PATCH, EXTENDED RELEASE TRANSDERMAL
COMMUNITY
Start: 2022-07-22

## 2022-09-23 RX ORDER — LORAZEPAM 1 MG/1
TABLET ORAL
COMMUNITY
Start: 2022-08-29

## 2022-09-23 RX ORDER — ASPIRIN 81 MG/1
81 TABLET ORAL
COMMUNITY

## 2022-09-23 NOTE — PROGRESS NOTES
[facility] - Outpatient Follow-up Note  Geri Madrid 74 y o  female MRN: [id number]  Encounter: [id number]          ASSESSMENT AND PLAN:      1  Hx of adenomatous colonic polyps  Patient is due for colonoscopy in 7 years    2  Gastroesophageal reflux disease with esophagitis, unspecified whether hemorrhage  Reflux symptoms are controlled on omeprazole which she takes at night instead of the morning which works for her    3  James's esophagus without dysplasia  Patient with history of Barretts esophagus with most recent biopsies negative for intestinal metaplasia and would recommend EGD for surveillance in 3 years    4  Pill dysphagia  Patient with difficulty swallowing pills and patient has made adjustments and she is not significantly bothered by this so we will defer any further evaluation but could consider barium swallow with 13 mm tablet in the future  Otherwise will see in 1 year for follow-up    ______________________________________________________________________    SUBJECTIVE:      This is a 75 y/o female who presents today for follow up  Patient had recent EGD and colonoscopy and patient was reporting difficulty swallowing pills  Patient states that this was a newer issue and now she has just been trying to take less pills at once  Patient has history of James's esophagus and most recent biopsies were negative  She will be due for an EGD in 3 years  She does have reflux and takes omeprazole which she takes at night and this controls her symptoms  She otherwise had equivocal biopsies in the past for lymphocytes in the duodenum but biopsies were negative for celiac and patient does not adhere to a gluten free diet  She otherwise had tubular adenomatous colon polyps and is due for colonoscopy in 7 years  REVIEW OF SYSTEMS IS OTHERWISE NEGATIVE        Historical Information   Past Medical History:   Diagnosis Date    Anxiety     Asthma     asthma related, used inhaler 1 week ago    James's esophagus     Colon polyp     Diabetes mellitus ([address] )     NIDDM    Dysphagia     1 month ago started    GERD (gastroesophageal reflux disease)     Glaucoma     Hyperlipidemia      Past Surgical History:   Procedure Laterality Date    ABDOMINOPLASTY      also 2 abdominal hernia repairs at same time    BREAST BIOPSY Left 2016     SECTION      COLONOSCOPY      HYSTERECTOMY  1991    OOPHORECTOMY  1991    REDUCTION MAMMAPLASTY Bilateral 2001    UPPER GASTROINTESTINAL ENDOSCOPY      US GUIDED BREAST BIOPSY LEFT COMPLETE Left 2016     Social History   Social History     Substance and Sexual Activity   Alcohol Use Yes    Alcohol/week: 3 0 standard drinks    Types: 3 Glasses of wine per week    Comment: social, 2 x month     Social History     Substance and Sexual Activity   Drug Use Never     Social History     Tobacco Use   Smoking Status Former Smoker    Quit date:     Years since quittin 7   Smokeless Tobacco Never Used   Tobacco Comment    quit 20 years ago     Family History   Problem Relation Age of Onset    Breast cancer Maternal Aunt 79    Diabetes Mother     Breast cancer Mother 62    Colon cancer Father 54    Diabetes Father     Diabetes Brother     Diabetes Brother     No Known Problems Daughter     No Known Problems Maternal Grandmother     No Known Problems Paternal Grandmother     No Known Problems Paternal Aunt     No Known Problems Paternal Aunt     No Known Problems Paternal Aunt        Meds/Allergies       Current Outpatient Medications:     albuterol (PROVENTIL HFA,VENTOLIN HFA) 90 mcg/act inhaler    Allergy Relief 180 MG tablet    aspirin (ECOTRIN LOW STRENGTH) 81 mg EC tablet    aspirin 81 MG tablet    atorvastatin (LIPITOR) 20 mg tablet    calcium carbonate (TUMS EX) 750 mg chewable tablet    estradiol (ESTRACE) 1 mg tablet    fexofenadine (ALLEGRA) 180 MG tablet    glimepiride (AMARYL) 2 mg tablet    glucose blood test strip    latanoprost (XALATAN) 0 005 % ophthalmic solution    Latanoprost 0 005 % EMUL    levocetirizine (XYZAL) 5 MG tablet    LORazepam (ATIVAN) 0 5 mg tablet    LORazepam (ATIVAN) 1 mg tablet    metFORMIN (GLUCOPHAGE-XR) 500 mg 24 hr tablet    omeprazole (PriLOSEC) 40 MG capsule    scopolamine (TRANSDERM-SCOP) 1 mg/3 days TD 72 hr patch    flunisolide (NASALIDE) 25 MCG/ACT (0 025%) SOLN    Allergies   Allergen Reactions    Benzedrex [Propylhexedrine] Itching    Wound Dressing Adhesive Rash           Objective     Blood pressure 132/90, pulse 89, height 5' 5" (1 651 m), weight 111 kg (244 lb)  Body mass index is 40 6 kg/m²  PHYSICAL EXAM:      General Appearance:   Alert, cooperative, no distress   HEENT:   Normocephalic, atraumatic, anicteric      Neck:  Supple, symmetrical, trachea midline   Lungs:   Clear to auscultation bilaterally; no rales, rhonchi or wheezing; respirations unlabored    Heart[de-identified]   Regular rate and rhythm; no murmur, rub, or gallop  Abdomen:   Soft, non-tender, non-distended; normal bowel sounds; no masses, no organomegaly    Genitalia:   Deferred    Rectal:   Deferred    Extremities:  No cyanosis, clubbing or edema    Pulses:  2+ and symmetric    Skin:  No jaundice, rashes, or lesions    Lymph nodes:  No palpable cervical lymphadenopathy        Lab Results:   No visits with results within 1 Day(s) from this visit     Latest known visit with results is:   Hospital Outpatient Visit on 09/06/2022   Component Date Value    Case Report 09/06/2022                      Value:Surgical Pathology Report                         Case: [id number]                                   Authorizing Provider:  Didi Machado MD    Collected:           09/06/2022 0746              Ordering Location:     [facility] [place]note 3[facility] Received:            09/06/2022 2135                                     [facility] Pathologist:           Maverick Granados MD                                                       Specimens:   A) - Duodenum, cold bx Duodenum check for celiac disease                                            B) - Stomach, cold bx Antrum check for hpy                                                          C) - Esophagus, cold bx Eg junction hx of barretts                                                  D) - Esophagus, cold bx Upper esophagus check for EOE                                               E) - Large Intestine, Right/Ascending Colon, cold snare Ascending polypsx2                 Final Diagnosis 09/06/2022                      Value: This result contains rich text formatting which cannot be displayed here   Note 09/06/2022                      Value: This result contains rich text formatting which cannot be displayed here   Additional Information 09/06/2022                      Value: This result contains rich text formatting which cannot be displayed here   Synoptic Checklist 09/06/2022                      Value:                            COLON/RECTUM POLYP FORM - GI - All Specimens                                                                                     :    Adenoma(s)      Gross Description 09/06/2022                      Value: This result contains rich text formatting which cannot be displayed here  Radiology Results:   EGD    Addendum Date: 9/6/2022 Addendum:   [facility] [address] [facility] [address] [phone] DATE OF SERVICE: 9/06/22 PHYSICIAN(S): Attending: Sean Chew MD Fellow: No Staff Documented INDICATION: James's esophagus without dysplasia, Gastroesophageal reflux disease with esophagitis, unspecified whether hemorrhage POST-OP DIAGNOSIS: See the impression below  PREPROCEDURE: Informed consent was obtained for the procedure, including sedation    Risks of perforation, hemorrhage, adverse drug reaction and aspiration were discussed  The patient was placed in the left lateral decubitus position  Patient was explained about the risks and benefits of the procedure  Risks including but not limited to bleeding, infection, and perforation were explained in detail  Also explained about less than 100% sensitivity with the exam and other alternatives  DETAILS OF PROCEDURE: Patient was taken to the procedure room where a time out was performed to confirm correct patient and correct procedure  The patient underwent monitored anesthesia care, which was administered by an anesthesia professional  The patient's blood pressure, heart rate, level of consciousness, respirations and oxygen were monitored throughout the procedure  The scope was advanced to the second part of the duodenum  Retroflexion was performed in the fundus  The patient experienced no blood loss  The procedure was not difficult  The patient tolerated the procedure well  There were no apparent complications  ANESTHESIA INFORMATION: ASA: II Anesthesia Type: IV Sedation with Anesthesia MEDICATIONS: No administrations occurring from 0702 to 0748 on 09/06/22 FINDINGS: Performed forceps biopsies in the duodenal bulb, 1st part of the duodenum and 2nd part of the duodenum Erythematous mucosa in the antrum; performed cold forceps biopsy Irregular Z-line; performed 8 cold forceps biopsies   Upper esophagus some rippling of the mucosa biopsied The remainder of a detailed exam otherwise unremarkable SPECIMENS: ID Type Source Tests Collected by Time Destination 1 : cold bx Duodenum check for celiac disease Tissue Duodenum TISSUE EXAM Bill Lopes MD 9/6/2022  7:46 AM  IMPRESSION: Some equivocal lymphocytes in the duodenum with tTG of 4 in the past which is equivocal, repeat biopsies pending, mild antritis, Barretts esophagus, evaluate for EOE RECOMMENDATION: Await biopsies, schedule follow-up office visit, acid suppression, repeat EGD in 3 years Darlin Cardoso MD     Result Date: 9/6/2022  Narrative: [facility] [address] [facility] [address] [phone] DATE OF SERVICE: 9/06/22 PHYSICIAN(S): Attending: Darlin Cardoso MD Fellow: No Staff Documented INDICATION: James's esophagus without dysplasia, Gastroesophageal reflux disease with esophagitis, unspecified whether hemorrhage POST-OP DIAGNOSIS: See the impression below  PREPROCEDURE: Informed consent was obtained for the procedure, including sedation  Risks of perforation, hemorrhage, adverse drug reaction and aspiration were discussed  The patient was placed in the left lateral decubitus position  Patient was explained about the risks and benefits of the procedure  Risks including but not limited to bleeding, infection, and perforation were explained in detail  Also explained about less than 100% sensitivity with the exam and other alternatives  DETAILS OF PROCEDURE: Patient was taken to the procedure room where a time out was performed to confirm correct patient and correct procedure  The patient underwent monitored anesthesia care, which was administered by an anesthesia professional  The patient's blood pressure, heart rate, level of consciousness, respirations and oxygen were monitored throughout the procedure  The scope was advanced to the second part of the duodenum  Retroflexion was performed in the fundus  The patient experienced no blood loss  The procedure was not difficult  The patient tolerated the procedure well  There were no apparent complications  ANESTHESIA INFORMATION: ASA: II Anesthesia Type: IV Sedation with Anesthesia MEDICATIONS: No administrations occurring from 0702 to 0748 on 09/06/22 FINDINGS: Performed forceps biopsies in the duodenal bulb, 1st part of the duodenum and 2nd part of the duodenum Erythematous mucosa in the antrum; performed cold forceps biopsy Irregular Z-line; performed 8 cold forceps biopsies   Upper esophagus some rippling of the mucosa biopsied The remainder of a detailed exam otherwise unremarkable SPECIMENS: ID Type Source Tests Collected by Time Destination 1 : cold bx Duodenum check for celiac disease Tissue Duodenum TISSUE EXAM Jenny Schultz MD 9/6/2022  7:46 AM      Impression: Some equivocal lymphocytes in the duodenum with tTG of 4 in the past which is equivocal, repeat biopsies pending, mild antritis, Barretts esophagus, evaluate for EOE RECOMMENDATION: Await biopsies, schedule follow-up office visit, acid suppression   Jenny Schultz MD     Colonoscopy    Result Date: 9/6/2022  Narrative: [facility] [address] [facility] [address] [phone] DATE OF SERVICE: 9/06/22 PHYSICIAN(S): Attending: Jenny Schultz MD Fellow: No Staff Documented INDICATION: Irritable bowel syndrome with diarrhea, Hx of adenomatous colonic polyps POST-OP DIAGNOSIS: See the impression below  HISTORY: Prior colonoscopy: 3 years ago  BOWEL PREPARATION: Miralax/Dulcolax PREPROCEDURE: Informed consent was obtained for the procedure, including sedation  Risks including but not limited to bleeding, infection, perforation, adverse drug reaction and aspiration were explained in detail  Also explained about less than 100% sensitivity with the exam and other alternatives  The patient was placed in the left lateral decubitus position  DETAILS OF PROCEDURE: Patient was taken to the procedure room where a time out was performed to confirm correct patient and correct procedure  The patient underwent monitored anesthesia care, which was administered by an anesthesia professional  The patient's blood pressure, heart rate, level of consciousness, oxygen and respirations were monitored throughout the procedure  A digital rectal exam was performed  The scope was introduced through the anus and advanced to the cecum  Retroflexion was performed in the rectum   The quality of bowel preparation was evaluated using the [place] Bowel Preparation Scale with scores of: right colon = 2, transverse colon = 2, left colon = 2  The total BBPS score was 6  Bowel prep was adequate  The patient experienced no blood loss  The procedure was not difficult  The patient tolerated the procedure well  There were no apparent complications   ANESTHESIA INFORMATION: ASA: II Anesthesia Type: IV Sedation with Anesthesia MEDICATIONS: No administrations occurring from 0702 to 0817 on 09/06/22 FINDINGS: Two polyps measuring from 5 mm up to 6 mm in the ascending colon The remainder of a detailed exam was within normal limits including the appendix opening EVENTS: Procedure Events Event Event Time ENDO SCOPE OUT TIME 9/6/2022  7:47 AM ENDO CECUM REACHED 9/6/2022  8:00 AM ENDO SCOPE OUT TIME 9/6/2022  8:15 AM SPECIMENS: ID Type Source Tests Collected by Time Destination 1 : cold bx Duodenum check for celiac disease Tissue Duodenum TISSUE EXAM Sean Chew MD 9/6/2022  7:46 AM  2 : cold bx Antrum check for hpy  Tissue Stomach TISSUE EXAM Sean Chew MD 9/6/2022  7:48 AM  3 : cold bx Eg junction hx of barretts Tissue Esophagus TISSUE EXAM Sean Chew MD 9/6/2022  7:49 AM  4 : cold bx Upper esophagus check for EOE Tissue Esophagus TISSUE EXAM Sean Chew MD 9/6/2022  7:50 AM  5 : cold snare Ascending polypsx2 Tissue Large Intestine, Right/Ascending Colon TISSUE EXAM Sean Chew MD 9/6/2022  8:02 AM  EQUIPMENT: Colonoscope -[id number]     Impression: History of adenomas 2 polyps removed today RECOMMENDATION: Repeat colonoscopy in 7 years due to a personal history of colon polyps  Sean Chew MD

## 2022-10-03 DIAGNOSIS — K21.9 GASTROESOPHAGEAL REFLUX DISEASE WITHOUT ESOPHAGITIS: ICD-10-CM

## 2022-10-03 RX ORDER — OMEPRAZOLE 40 MG/1
40 CAPSULE, DELAYED RELEASE ORAL DAILY
Qty: 90 CAPSULE | Refills: 3 | Status: SHIPPED | OUTPATIENT
Start: 2022-10-03 | End: 2022-10-05 | Stop reason: SDUPTHER

## 2022-10-03 NOTE — TELEPHONE ENCOUNTER
[facility]  Delivery for Omeprazole 40 mg daily  90 day supply  Patient last seen in office with Sandrine Patrick PA-C on 9/23/22

## 2022-10-05 ENCOUNTER — TELEPHONE (OUTPATIENT)
Dept: GASTROENTEROLOGY | Facility: AMBULARY SURGERY CENTER | Age: 67
End: 2022-10-05

## 2022-10-05 DIAGNOSIS — K21.9 GASTROESOPHAGEAL REFLUX DISEASE WITHOUT ESOPHAGITIS: ICD-10-CM

## 2022-10-05 RX ORDER — OMEPRAZOLE 40 MG/1
40 CAPSULE, DELAYED RELEASE ORAL DAILY
Qty: 90 CAPSULE | Refills: 3 | Status: SHIPPED | OUTPATIENT
Start: 2022-10-05 | End: 2023-09-05

## 2022-10-18 ENCOUNTER — APPOINTMENT (OUTPATIENT)
Dept: LAB | Facility: CLINIC | Age: 67
End: 2022-10-18
Payer: MEDICARE

## 2022-10-18 ENCOUNTER — OFFICE VISIT (OUTPATIENT)
Dept: GASTROENTEROLOGY | Facility: CLINIC | Age: 67
End: 2022-10-18
Payer: MEDICARE

## 2022-10-18 ENCOUNTER — NURSE TRIAGE (OUTPATIENT)
Dept: OTHER | Facility: OTHER | Age: 67
End: 2022-10-18

## 2022-10-18 VITALS
HEIGHT: 65 IN | BODY MASS INDEX: 41.65 KG/M2 | DIASTOLIC BLOOD PRESSURE: 90 MMHG | SYSTOLIC BLOOD PRESSURE: 150 MMHG | HEART RATE: 83 BPM | WEIGHT: 250 LBS

## 2022-10-18 DIAGNOSIS — K92.1 BLACK STOOL: ICD-10-CM

## 2022-10-18 DIAGNOSIS — K22.70 BARRETT'S ESOPHAGUS WITHOUT DYSPLASIA: ICD-10-CM

## 2022-10-18 DIAGNOSIS — R19.7 DIARRHEA, UNSPECIFIED TYPE: ICD-10-CM

## 2022-10-18 DIAGNOSIS — R10.84 GENERALIZED ABDOMINAL PAIN: Primary | ICD-10-CM

## 2022-10-18 DIAGNOSIS — Z86.010 HX OF ADENOMATOUS COLONIC POLYPS: ICD-10-CM

## 2022-10-18 DIAGNOSIS — K21.9 GASTROESOPHAGEAL REFLUX DISEASE WITHOUT ESOPHAGITIS: ICD-10-CM

## 2022-10-18 DIAGNOSIS — Z12.11 COLON CANCER SCREENING: ICD-10-CM

## 2022-10-18 DIAGNOSIS — R11.0 NAUSEA: ICD-10-CM

## 2022-10-18 LAB
BUN SERPL-MCNC: 11 MG/DL (ref 5–25)
CREAT SERPL-MCNC: 0.81 MG/DL (ref 0.6–1.3)
ERYTHROCYTE [DISTWIDTH] IN BLOOD BY AUTOMATED COUNT: 13.2 % (ref 11.6–15.1)
GFR SERPL CREATININE-BSD FRML MDRD: 76 ML/MIN/1.73SQ M
HCT VFR BLD AUTO: 37.1 % (ref 34.8–46.1)
HGB BLD-MCNC: 12.4 G/DL (ref 11.5–15.4)
MCH RBC QN AUTO: 29.7 PG (ref 26.8–34.3)
MCHC RBC AUTO-ENTMCNC: 33.4 G/DL (ref 31.4–37.4)
MCV RBC AUTO: 89 FL (ref 82–98)
PLATELET # BLD AUTO: 311 THOUSANDS/UL (ref 149–390)
PMV BLD AUTO: 10.1 FL (ref 8.9–12.7)
RBC # BLD AUTO: 4.17 MILLION/UL (ref 3.81–5.12)
WBC # BLD AUTO: 7.13 THOUSAND/UL (ref 4.31–10.16)

## 2022-10-18 PROCEDURE — 84520 ASSAY OF UREA NITROGEN: CPT

## 2022-10-18 PROCEDURE — 99214 OFFICE O/P EST MOD 30 MIN: CPT | Performed by: NURSE PRACTITIONER

## 2022-10-18 PROCEDURE — 36415 COLL VENOUS BLD VENIPUNCTURE: CPT

## 2022-10-18 PROCEDURE — 85027 COMPLETE CBC AUTOMATED: CPT

## 2022-10-18 PROCEDURE — 82565 ASSAY OF CREATININE: CPT

## 2022-10-18 RX ORDER — DICYCLOMINE HCL 20 MG
20 TABLET ORAL 2 TIMES DAILY
Qty: 60 TABLET | Refills: 0 | Status: SHIPPED | OUTPATIENT
Start: 2022-10-18 | End: 2022-10-18

## 2022-10-18 RX ORDER — ONDANSETRON 4 MG/1
4 TABLET, FILM COATED ORAL EVERY 8 HOURS PRN
Qty: 60 TABLET | Refills: 1 | Status: SHIPPED | OUTPATIENT
Start: 2022-10-18 | End: 2022-10-18

## 2022-10-18 RX ORDER — DICYCLOMINE HCL 20 MG
20 TABLET ORAL 2 TIMES DAILY
Qty: 60 TABLET | Refills: 1 | Status: SHIPPED | OUTPATIENT
Start: 2022-10-18 | End: 2022-10-27 | Stop reason: SDUPTHER

## 2022-10-18 RX ORDER — ONDANSETRON 4 MG/1
4 TABLET, FILM COATED ORAL EVERY 8 HOURS PRN
Qty: 60 TABLET | Refills: 1 | Status: SHIPPED | OUTPATIENT
Start: 2022-10-18

## 2022-10-18 NOTE — TELEPHONE ENCOUNTER
I called and spoke to patient  She states she is having diarrhea 8-10 times daily, lower mid abdominal pain (pain level 5-6/10), and constant nausea  She is taking pepto bismol 2 tablets in the morning and omeprazole 40 mg daily and having no relief  I scheduled her for ov today with Marisela REDDY at 2 pm at our  [employer] office  Patient verbalized understanding   Thank you

## 2022-10-18 NOTE — TELEPHONE ENCOUNTER
Reason for Disposition  • MODERATE pain (e g , interferes with normal activities that comes and goes (cramps) lasts > 24 hours (Exception: pain with Vomiting or Diarrhea - see that Protocol)    Answer Assessment - Initial Assessment Questions  1  LOCATION: "Where does it hurt?"      Abdominal pain  2  RADIATION: "Does the pain shoot anywhere else?" (e g , chest, back)      Denies  3  ONSET: "When did the pain begin?" (e g , minutes, hours or days ago)       Last week  4  SUDDEN: "Gradual or sudden onset?"      Sudden  5  PATTERN "Does the pain come and go, or is it constant?"     - If constant: "Is it getting better, staying the same, or worsening?"       (Note: Constant means the pain never goes away completely; most serious pain is constant and it progresses)      - If intermittent: "How long does it last?" "Do you have pain now?"      (Note: Intermittent means the pain goes away completely between bouts)      Constant    7  RECURRENT SYMPTOM: "Have you ever had this type of stomach pain before?" If Yes, ask: "When was the last time?" and "What happened that time?"       Diverticulitis    9  RELIEVING/AGGRAVATING FACTORS: "What makes it better or worse?" (e g , movement, antacids, bowel movement)      Pepto and heat  10   OTHER SYMPTOMS: "Has there been any vomiting, diarrhea, constipation, or urine problems?"        Nausea  Pepto tablets    Protocols used: ABDOMINAL PAIN - [facility] - PRERNA HARDY

## 2022-10-18 NOTE — PROGRESS NOTES
[facility] - Outpatient Follow-up Note  Arnaldo Souza 74 y o  female MRN: [id number]  Encounter: [id number]          ASSESSMENT AND PLAN:      1  Generalized abdominal pain  2  Diarrhea, unspecified type  Patient reports onset of diarrhea approximately 1 week ago  Patient reports she is moving her bowels 8-10 times a day stool has been black tea green liquid and amount of stool with each bowel movement varies  Patient denies any bright red blood in stool or bright red blood from rectal area  Patient has been taking Pepto-Bismol as needed for diarrhea with little review relief  Abdomen exam positive for tenderness in epigastric area otherwise benign  Patient also reports generalized abdominal pain associated with onset of diarrhea and nausea  Diarrhea and generalized abdominal pain may be secondary to irritable bowel syndrome diarrhea, lesion, gastroenteritis, or colitis  - C difficile Toxins A+B, EIA; Future  - Stool Enteric Bacterial Panel by PCR; Future  - Giardia lamblia, EIA and Ova and Parasites Examination; Future  - CT abdomen w contrast; Future  - BUN; Future  - Creatinine, serum; Future  - dicyclomine (BENTYL) 20 mg tablet; Take 1 tablet (20 mg total) by mouth 2 (two) times a day In the a m  with breakfast in the p m  with dinner  Dispense: 60 tablet; Refill: 1    3  Black stool  Patient reports that the liquid stool will be black a dark green  Patient is on iron supplements and has been taking Pepto-Bismol for diarrhea which is most likely causing patient's stool to become black     -Will check CBC to ensure that hemoglobin is stable  CBC and Platelet; Future    4  Nausea  Patient reports intermittent nausea since diarrhea and abdominal pain onset approximately 1 week ago  Nausea may be secondary to GERD, gastroenteritis, colitis, or underlying infection  - ondansetron (ZOFRAN) 4 mg tablet;  Take 1 tablet (4 mg total) by mouth every 8 (eight) hours as needed for nausea or vomiting  Dispense: 60 tablet; Refill: 1    5  Hx of adenomatous colonic polyps  6  Colon cancer screening  Patient has history of colon polyps  Colonoscopy done 09/06/2022 which showed 2 colon polyps removed  Polyps were positive for tubular adenomas  Colon cancer screening up-to-date  7  Gastroesophageal reflux disease without esophagitis  8  James's esophagus without dysplasia  Patient has history of James's esophagus and GERD  Patient reports that GERD symptoms are well controlled on current medication   -Continue anti-reflux diet and measures   -Continue omeprazole 40 mg daily    Follow-up in 3 months  ______________________________________________________________________    SUBJECTIVE:  Sallee Goldmann is a 62-year-old female who presents to office with report of abdominal pain, diarrhea, and nausea x1 week  Patient reports she is moving her bowels 8-10 times a day stool has been black tea green liquid and amount of stool with each bowel movement varies  Patient denies any bright red blood in stool or bright red blood from rectal area  Patient has been taking Pepto-Bismol as needed for diarrhea with little review relief  Abdomen exam positive for tenderness in epigastric area otherwise benign  Patient also reports generalized abdominal pain associated with onset of diarrhea and nausea  Patient reports intermittent nausea since diarrhea and abdominal pain onset approximately 1 week ago  Patient does have an underlying history of irritable bowel syndrome diarrhea  Patient has history of James's esophagus and GERD  Patient reports that GERD symptoms are well controlled on current medication  Positive family history colon cancer father diagnosed age 54  Patient is a former smoker she quit in [facility]  Results of recent EGD and colonoscopy and biopsy results reviewed with patient  Colonoscopy done 09/06/2022 which showed 2 colon polyps removed    Polyps were positive for tubular adenomas, no high-grade dysplasia or malignancy  EGD done 2022 showed some equivocal lymphocytes in the duodenum with tTG of 4 in the past which is equivocal, repeat biopsies pending, mild antritis, Barretts esophagus, evaluate for EOE  Biopsy of duodenum benign small intestinal mucosa and no evidence of increased intraepithelial lymphocytes  Stomach biopsy no H pylori, no ulceration, no dysplasia or malignancy  Mild chronic inflammation consistent with reactive/chemical gastritis  EG junction biopsy negative goblet cell metaplasia, no dysplasia or malignancy  Moderate chronic inflammation with reactive epithelial changes  Gastroesophageal junction mucosa with cardiac type glands present in-between squamous mucosa (esophageal columnar metaplasia)  Esophagus biopsy benign squamous mucosa with no pathological changes  No evidence of eosinophilic esophagitis  No intestinal metaplasia, dysplasia, or malignancy  REVIEW OF SYSTEMS IS OTHERWISE NEGATIVE        Historical Information   Past Medical History:   Diagnosis Date   • Anxiety    • Asthma     asthma related, used inhaler 1 week ago   • James's esophagus    • Colon polyp    • Diabetes mellitus ([facility] [address] )     NIDDM   • Dysphagia     1 month ago started   • GERD (gastroesophageal reflux disease)    • Glaucoma    • Hyperlipidemia      Past Surgical History:   Procedure Laterality Date   • ABDOMINOPLASTY      also 2 abdominal hernia repairs at same time   • BREAST BIOPSY Left    •  SECTION     • COLONOSCOPY     • HYSTERECTOMY     • OOPHORECTOMY     • REDUCTION MAMMAPLASTY Bilateral    • UPPER GASTROINTESTINAL ENDOSCOPY     • US GUIDED BREAST BIOPSY LEFT COMPLETE Left 2016     Social History   Social History     Substance and Sexual Activity   Alcohol Use Yes   • Alcohol/week: 3 0 standard drinks   • Types: 3 Glasses of wine per week    Comment: social, 2 x month     Social History     Substance and Sexual Activity   Drug Use Never Social History     Tobacco Use   Smoking Status Former Smoker   • Quit date:    • Years since quittin 8   Smokeless Tobacco Never Used   Tobacco Comment    quit 20 years ago     Family History   Problem Relation Age of Onset   • Breast cancer Maternal Aunt 79   • Diabetes Mother    • Breast cancer Mother 62   • Colon cancer Father 54   • Diabetes Father    • Diabetes Brother    • Diabetes Brother    • No Known Problems Daughter    • No Known Problems Maternal Grandmother    • No Known Problems Paternal Grandmother    • No Known Problems Paternal Aunt    • No Known Problems Paternal Aunt    • No Known Problems Paternal Aunt        Meds/Allergies       Current Outpatient Medications:   •  albuterol (PROVENTIL HFA,VENTOLIN HFA) 90 mcg/act inhaler  •  Allergy Relief 180 MG tablet  •  aspirin (ECOTRIN LOW STRENGTH) 81 mg EC tablet  •  aspirin 81 MG tablet  •  atorvastatin (LIPITOR) 20 mg tablet  •  calcium carbonate (TUMS EX) 750 mg chewable tablet  •  dicyclomine (BENTYL) 20 mg tablet  •  estradiol (ESTRACE) 1 mg tablet  •  fexofenadine (ALLEGRA) 180 MG tablet  •  glimepiride (AMARYL) 2 mg tablet  •  glucose blood test strip  •  latanoprost (XALATAN) 0 005 % ophthalmic solution  •  Latanoprost 0 005 % EMUL  •  levocetirizine (XYZAL) 5 MG tablet  •  LORazepam (ATIVAN) 0 5 mg tablet  •  LORazepam (ATIVAN) 1 mg tablet  •  metFORMIN (GLUCOPHAGE-XR) 500 mg 24 hr tablet  •  omeprazole (PriLOSEC) 40 MG capsule  •  ondansetron (ZOFRAN) 4 mg tablet  •  scopolamine (TRANSDERM-SCOP) 1 mg/3 days TD 72 hr patch  •  flunisolide (NASALIDE) 25 MCG/ACT (0 025%) SOLN    Allergies   Allergen Reactions   • Benzedrex [Propylhexedrine] Itching   • Wound Dressing Adhesive Rash           Objective     Blood pressure 150/90, pulse 83, height 5' 5" (1 651 m), weight 113 kg (250 lb)  Body mass index is 41 6 kg/m²        PHYSICAL EXAM:      General Appearance:   Alert, cooperative, no distress   HEENT:   Normocephalic, atraumatic, anicteric      Neck:  Supple, symmetrical, trachea midline   Lungs:   Clear to auscultation bilaterally; no rales, rhonchi or wheezing; respirations unlabored    Heart[de-identified]   Regular rate and rhythm; no murmur, rub, or gallop  Abdomen:   Soft, mild tenderness in epigastric area with palpation, non-distended; normal bowel sounds; no masses, no organomegaly    Genitalia:   Deferred    Rectal:   Deferred    Extremities:  No cyanosis, clubbing or edema    Pulses:  2+ and symmetric    Skin:  No jaundice, rashes, or lesions    Lymph nodes:  No palpable cervical lymphadenopathy        Lab Results:   No visits with results within 1 Day(s) from this visit  Latest known visit with results is:   Hospital Outpatient Visit on 09/06/2022   Component Date Value   • Case Report 09/06/2022                      Value:Surgical Pathology Report                         Case: [id number]                                   Authorizing Provider:  Saskia Wayne MD    Collected:           09/06/2022 0746              Ordering Location:     [address] [place] [address] Received:            09/06/2022 [address] [place] [address]                                                                  Pathologist:           Kylah Oshea MD                                                       Specimens:   A) - Duodenum, cold bx Duodenum check for celiac disease                                            B) - Stomach, cold bx Antrum check for hpy                                                          C) - Esophagus, cold bx Eg junction hx of barretts                                                  D) - Esophagus, cold bx Upper esophagus check for EOE                                               E) - Large Intestine, Right/Ascending Colon, cold snare Ascending polypsx2                • Final Diagnosis 09/06/2022                      Value: This result contains rich text formatting which cannot be displayed here     • Note 09/06/2022                      Value: This result contains rich text formatting which cannot be displayed here  • Additional Information 09/06/2022                      Value: This result contains rich text formatting which cannot be displayed here  • Synoptic Checklist 09/06/2022                      Value:                            COLON/RECTUM POLYP FORM - GI - All Specimens                                                                                     :    Adenoma(s)     • Gross Description 09/06/2022                      Value: This result contains rich text formatting which cannot be displayed here  Radiology Results:   No results found

## 2022-10-18 NOTE — TELEPHONE ENCOUNTER
Regarding: Post procedure symptoms, pain, diarrhea, and nausea  ----- Message from Jihan Jauregui sent at 10/18/2022 10:19 AM EDT -----  "I had a colonoscopy and endoscopy in September and have been having stomach pain, diarrhea, and nausea "

## 2022-10-19 ENCOUNTER — APPOINTMENT (OUTPATIENT)
Dept: LAB | Facility: CLINIC | Age: 67
End: 2022-10-19
Payer: MEDICARE

## 2022-10-19 DIAGNOSIS — R19.7 DIARRHEA, UNSPECIFIED TYPE: ICD-10-CM

## 2022-10-19 PROCEDURE — 87505 NFCT AGENT DETECTION GI: CPT

## 2022-10-19 PROCEDURE — 87177 OVA AND PARASITES SMEARS: CPT

## 2022-10-19 PROCEDURE — 87209 SMEAR COMPLEX STAIN: CPT

## 2022-10-20 LAB
C DIFF TOX GENS STL QL NAA+PROBE: NEGATIVE
CAMPYLOBACTER DNA SPEC NAA+PROBE: NORMAL
SALMONELLA DNA SPEC QL NAA+PROBE: NORMAL
SHIGA TOXIN STX GENE SPEC NAA+PROBE: NORMAL
SHIGELLA DNA SPEC QL NAA+PROBE: NORMAL

## 2022-10-21 ENCOUNTER — TELEPHONE (OUTPATIENT)
Dept: GASTROENTEROLOGY | Facility: CLINIC | Age: 67
End: 2022-10-21

## 2022-10-21 NOTE — TELEPHONE ENCOUNTER
Pt called in stating that she received a call from Carina Laws yesterday and was just returning the phone call  Please follow up with pt

## 2022-10-25 LAB
G LAMBLIA AG STL QL IA: NEGATIVE
O+P STL CONC: NORMAL

## 2022-10-26 ENCOUNTER — HOSPITAL ENCOUNTER (OUTPATIENT)
Dept: CT IMAGING | Facility: HOSPITAL | Age: 67
Discharge: HOME | End: 2022-10-26

## 2022-10-26 DIAGNOSIS — R19.7 DIARRHEA, UNSPECIFIED TYPE: ICD-10-CM

## 2022-10-26 DIAGNOSIS — R10.84 GENERALIZED ABDOMINAL PAIN: ICD-10-CM

## 2022-10-26 RX ADMIN — IOHEXOL 100 ML: 350 INJECTION, SOLUTION INTRAVENOUS at 07:51

## 2022-10-27 ENCOUNTER — TELEPHONE (OUTPATIENT)
Dept: GASTROENTEROLOGY | Facility: CLINIC | Age: 67
End: 2022-10-27

## 2022-10-27 DIAGNOSIS — R10.84 GENERALIZED ABDOMINAL PAIN: Primary | ICD-10-CM

## 2022-10-27 DIAGNOSIS — R19.7 DIARRHEA, UNSPECIFIED TYPE: ICD-10-CM

## 2022-10-27 DIAGNOSIS — K58.0 IRRITABLE BOWEL SYNDROME WITH DIARRHEA: Primary | ICD-10-CM

## 2022-10-27 RX ORDER — DICYCLOMINE HCL 20 MG
20 TABLET ORAL EVERY 6 HOURS
Qty: 120 TABLET | Refills: 0 | Status: SHIPPED | OUTPATIENT
Start: 2022-10-27 | End: 2022-10-28 | Stop reason: SDUPTHER

## 2022-10-27 NOTE — TELEPHONE ENCOUNTER
If she does not think taking bentyl BID is helping with the pain, she can take this up to 4 times/day  Taking it this often can give her constipation however this might be helpful in terms of her diarrhea  If the bentyl just guerrero snot work in general, I can send in [facility] for her to try instead  In terms of the diarrhea: as her stool studies are negative, she can start using OTC imodium for this at this time  Thanks!

## 2022-10-27 NOTE — TELEPHONE ENCOUNTER
Patients GI provider:  Dr Lou Meigs    Number to return call: [phone]    Reason for call: Pt calling to see if the results were in for her CT scan and to see if there was anything she can take for her abd pain  Requesting to speak to [facility]  Above is her number       Scheduled procedure/appointment date if applicable: Apt/procedure NA

## 2022-10-27 NOTE — TELEPHONE ENCOUNTER
Patient aware of the CT Scan results are not available   Patient complains of abdominal pain 8 sometimes 9 out of ten it starts at the bottom of ribcage and ends in the lower abdomen and states that it hurts to touch and feels waves of nausea come over her  She said that this has been going on for sometime now  Patient would like to speak to [facility] but she is out of the office for the rest of the week  Please call the patient

## 2022-10-27 NOTE — TELEPHONE ENCOUNTER
Spoke with patient  History of generalized abdominal pain, black stool, diarrhea, nausea    Patient c/o continued generalized abdominal cramping lasting all day no change  +nausea (zofran is effective)  Diarrhea BM have decreased to 3-5 episodes a day  Stool studies, and blood work are negative  Warm compresses alleviate pain only when applied  Denies vomiting  Fever, chills, SOB, weakness, blood stools  Patient understands BM are black d/t iron and pepto PRN  Taking omeprazole 40mg daily, and bentyl 20mg BID  Reassured patient we will call once CT scan results are available, and continue to move forward with tmt  Advised okay to use tylenol PRN  Any other suggestions?

## 2022-10-28 DIAGNOSIS — K58.9 SPASM OF BOWEL: Primary | ICD-10-CM

## 2022-10-28 RX ORDER — DICYCLOMINE HCL 20 MG
20 TABLET ORAL EVERY 6 HOURS
Qty: 120 TABLET | Refills: 0 | Status: SHIPPED | OUTPATIENT
Start: 2022-10-28 | End: 2022-10-28 | Stop reason: ALTCHOICE

## 2022-10-28 NOTE — TELEPHONE ENCOUNTER
Patient stated that her pharmacy told her that dicyclomine (BENTYL) 20 mg tablet can not be refilled until 11/28/2022  Patient is requesting a call back to discuss

## 2022-10-31 DIAGNOSIS — K58.9 SPASM OF BOWEL: ICD-10-CM

## 2022-11-03 DIAGNOSIS — K58.9 SPASM OF BOWEL: ICD-10-CM

## 2022-11-07 ENCOUNTER — TELEPHONE (OUTPATIENT)
Dept: OTHER | Facility: OTHER | Age: 67
End: 2022-11-07

## 2022-11-07 NOTE — TELEPHONE ENCOUNTER
Call from patient stating she is still having stomach pains  The diarrhea has stopped and she feels the pills helped with that but the stomach pain continues  Please reach out to patient to discuss

## 2022-11-08 NOTE — TELEPHONE ENCOUNTER
SPOKE WITH PT, STARTED XIFAXAN ON Friday, DIARRHEA IS IMPROVED, ABDOMINAL PAIN PERSISTS WITH BLOATING, STOMACH FEELS HARD, PAIN SHOOTING FROM NAVEL TO BLADDER AREA, RATES PAIN 25/10 AT TIMES

## 2022-11-08 NOTE — TELEPHONE ENCOUNTER
Patient is calling in today regarding her original call from yesterday  She states she did not get the voice message that was left  She continues with abdominal pain   Please advise

## 2022-11-08 NOTE — TELEPHONE ENCOUNTER
Called pt  She had recent EGD/Colonoscopy/CT scan  Suspect symptoms are secondary to IBS-D  Levsin was too expensive which was previously prescribed  She's only been taking Bentyl twice daily, advised she increase that to 3 times a day & she can use heating pad/gax-x as well  Side effect profile of Bentyl discussed  She's been taking the Xifaxan only twice a day, advised her she should be taking it 3 times a day as directed on the instructions  If this is ineffective can try IBgard/low fodmap diet

## 2022-12-17 PROBLEM — Z12.11 COLON CANCER SCREENING: Status: RESOLVED | Noted: 2022-10-18 | Resolved: 2022-12-17

## 2023-01-25 ENCOUNTER — ANNUAL EXAM (OUTPATIENT)
Dept: GYNECOLOGY | Facility: CLINIC | Age: 68
End: 2023-01-25

## 2023-01-25 VITALS
HEIGHT: 65 IN | WEIGHT: 238 LBS | SYSTOLIC BLOOD PRESSURE: 136 MMHG | DIASTOLIC BLOOD PRESSURE: 80 MMHG | BODY MASS INDEX: 39.65 KG/M2

## 2023-01-25 DIAGNOSIS — N39.3 SUI (STRESS URINARY INCONTINENCE, FEMALE): ICD-10-CM

## 2023-01-25 DIAGNOSIS — Z12.31 SCREENING MAMMOGRAM FOR BREAST CANCER: Primary | ICD-10-CM

## 2023-01-25 DIAGNOSIS — N30.10 INTERSTITIAL CYSTITIS: ICD-10-CM

## 2023-01-25 DIAGNOSIS — N95.2 VAGINAL ATROPHY: ICD-10-CM

## 2023-01-25 RX ORDER — ESTRADIOL 1 MG/1
1 TABLET ORAL DAILY
Qty: 90 TABLET | Refills: 3 | Status: SHIPPED | OUTPATIENT
Start: 2023-01-25

## 2023-01-25 NOTE — PROGRESS NOTES
Assessment/Plan:  Normal breast and GYN exam  Hysterectomy BSO   Interstitial cystitis  DIANE  Normal mammogram 2021  Colonoscopy with polyp 2022 due for repeat at 9 years  Doing well on ERT and like to continue  COVID infection May 2022    Plan: Rx mammogram   Rx estradiol 1 mg daily  Follow-up with urology next week to discuss possible surgery for DIANE  Recommend healthy diet and exercise  Recommend daily Kegel's  Subjective: ,      Patient ID: Socorro Marquez is a 74 y o  female presents for annual exam complaining of DIANE symptoms 2-3 times a day  Also has a history of interstitial cystitis which causes chronic lower pelvic pain  Followed by urogynecology who performed bladder instillation x 3 last year  Recently saw a new urologist for evaluation of DIANE symptoms  Has a follow-up visit next week  Denies any dyspareunia  Denies any breast or bowel issues  No change in family history  Father (colon cancer) mother and maternal aunt( breast cancer)  Medications reviewed  Review of Systems   Constitutional: Negative  Negative for fatigue, fever and unexpected weight change  HENT: Negative  Eyes: Negative  Respiratory: Negative  Negative for chest tightness, shortness of breath, wheezing and stridor  Cardiovascular: Negative  Negative for chest pain, palpitations and leg swelling  Gastrointestinal: Negative  Negative for abdominal pain, blood in stool, diarrhea, nausea, rectal pain and vomiting  Endocrine: Negative  Genitourinary: Negative for dysuria, frequency, vaginal bleeding, vaginal discharge and vaginal pain  Bladder pain  DIANE 2-3 times a day   Musculoskeletal: Negative  Skin: Negative  Allergic/Immunologic: Negative  Neurological: Negative  Hematological: Negative  Psychiatric/Behavioral: Negative  All other systems reviewed and are negative          Objective:      /80   Ht 5' 5" (1 651 m) Wt 108 kg (238 lb)   BMI 39 61 kg/m²          Physical Exam  Constitutional:       Appearance: She is well-developed  Cardiovascular:      Rate and Rhythm: Normal rate and regular rhythm  Heart sounds: Normal heart sounds  Pulmonary:      Effort: Pulmonary effort is normal  No respiratory distress  Breath sounds: No stridor  No wheezing or rales  Chest:      Chest wall: No tenderness  Breasts:     Breasts are symmetrical       Right: No inverted nipple, mass, nipple discharge, skin change or tenderness  Left: No inverted nipple, mass, nipple discharge, skin change or tenderness  Abdominal:      General: Bowel sounds are normal  There is no distension  Palpations: Abdomen is soft  There is no mass  Tenderness: There is no abdominal tenderness  There is no guarding or rebound  Hernia: No hernia is present  There is no hernia in the left inguinal area  Genitourinary:     Labia:         Right: No rash, tenderness, lesion or injury  Left: No rash, tenderness, lesion or injury  Vagina: Normal  No signs of injury and foreign body  No vaginal discharge, erythema, tenderness or bleeding  Adnexa:         Right: No mass, tenderness or fullness  Left: No mass, tenderness or fullness  Rectum: No mass, tenderness, anal fissure, external hemorrhoid or internal hemorrhoid  Normal anal tone  Comments: Urethral meatus normal   Vaginal atrophy  Vaginal cuff well supported  No cystocele or rectocele noted  Moderate muscle tone on Kegel attempt  Cervix and uterus tubes and ovaries absent  Lymphadenopathy:      Lower Body: No right inguinal adenopathy  No left inguinal adenopathy  Psychiatric:         Behavior: Behavior normal          Thought Content:  Thought content normal          Judgment: Judgment normal

## 2023-02-27 ENCOUNTER — OFFICE VISIT (OUTPATIENT)
Dept: GYNECOLOGY | Facility: CLINIC | Age: 68
End: 2023-02-27

## 2023-02-27 VITALS
BODY MASS INDEX: 39.15 KG/M2 | HEIGHT: 65 IN | DIASTOLIC BLOOD PRESSURE: 82 MMHG | WEIGHT: 235 LBS | SYSTOLIC BLOOD PRESSURE: 140 MMHG

## 2023-02-27 DIAGNOSIS — E11.69 DYSLIPIDEMIA ASSOCIATED WITH TYPE 2 DIABETES MELLITUS (HCC): ICD-10-CM

## 2023-02-27 DIAGNOSIS — L72.9 FOLLICULAR CYST OF SKIN AND SUBCUTANEOUS TISSUE: Primary | ICD-10-CM

## 2023-02-27 DIAGNOSIS — E78.5 DYSLIPIDEMIA ASSOCIATED WITH TYPE 2 DIABETES MELLITUS (HCC): ICD-10-CM

## 2023-02-27 RX ORDER — CEPHALEXIN 500 MG/1
500 CAPSULE ORAL EVERY 12 HOURS SCHEDULED
Qty: 14 CAPSULE | Refills: 0 | Status: SHIPPED | OUTPATIENT
Start: 2023-02-27 | End: 2023-03-06

## 2023-02-27 NOTE — PROGRESS NOTES
Assessment/Plan:      Diagnoses and all orders for this visit:    Follicular cyst of skin and subcutaneous tissue  -     cephalexin (KEFLEX) 500 mg capsule; Take 1 capsule (500 mg total) by mouth every 12 (twelve) hours for 7 days    [facility] with [facility]          Subjective:     Patient ID: Randa Race is a 74 y o  female presents to the office complaining of a boil on the vaginal area on the left side for 3 weeks  Patient has not used any over-the-counter medications  Recently on an antibiotic for an upper respiratory infection  Denies any fevers or chills  Has not seen any drainage from the site  Objective:     Physical Exam  Genitourinary:         Comments: Red: Slightly raised red indurated area consistent with an inflamed follicular cyst   No drainage noted  No groin nodes seen    Pelvic exam within normal limits

## 2023-03-03 ENCOUNTER — TELEPHONE (OUTPATIENT)
Dept: GYNECOLOGY | Facility: CLINIC | Age: 68
End: 2023-03-03

## 2023-03-03 NOTE — TELEPHONE ENCOUNTER
The patient called because she wanted to update you on her boil  She says that the swelling went down a little but she still has mild itching  No discharge or drainage

## 2023-03-06 NOTE — TELEPHONE ENCOUNTER
The patient was advised  The patient said the itching is the same and she has been doing warm compresses  Patient was advised to try neosporin and witch hazel to help with the itching and to call if the it worsens

## 2023-03-28 RX ORDER — MIRABEGRON 50 MG/1
TABLET, FILM COATED, EXTENDED RELEASE ORAL
COMMUNITY

## 2023-03-28 NOTE — PRE-PROCEDURE INSTRUCTIONS
My Surgical Experience    The following information was developed to assist you to prepare for your operation  What do I need to do before coming to the hospital?  • Arrange for a responsible person to drive you to and from the hospital   • Arrange care for your children at home  Children are not allowed in the recovery areas of the hospital  • Plan to wear clothing that is easy to put on and take off  If you are having shoulder surgery, wear a shirt that buttons or zippers in the front  Bathing  o Shower the evening before and the morning of your surgery with an antibacterial soap  Please refer to the Pre Op Showering Instructions for Surgery Patients Sheet   o Remove nail polish and all body piercing jewelry  o Do not shave any body part for at least 24 hours before surgery-this includes face, arms, legs and upper body  Food  o Nothing to eat or drink after midnight the night before your surgery  This includes candy and chewing gum  o Exception: If your surgery is after 12:00pm (noon), you may have clear liquids such as 7-Up®, ginger ale, apple or cranberry juice, Jell-O®, water, or clear broth until 8:00 am  o Do not drink milk or juice with pulp on the morning before surgery  o Do not drink alcohol 24 hours before surgery  Medicine  o Follow instructions you received from your surgeon about which medicines you may take on the day of surgery  o If instructed to take medicine on the morning of surgery, take pills with just a small sip of water  Call your prescribing doctor for specific infroamtion on what to do if you take insulin    What should I bring to the hospital?    Bring:  • Crutches or a walker, if you have them, for foot or knee surgery  • A list of the daily medicines, vitamins, minerals, herbals and nutritional supplements you take   Include the dosages of medicines and the time you take them each day  • Glasses, dentures or hearing aids  • Minimal clothing; you will be wearing hospital sleepwear  • Photo ID; required to verify your identity  • If you have a Living Will or Power of , bring a copy of the documents  • If you have an ostomy, bring an extra pouch and any supplies you use    Do not bring  • Medicines or inhalers  • Money, valuables or jewelry    What other information should I know about the day of surgery? • Notify your surgeons if you develop a cold, sore throat, cough, fever, rash or any other illness  • Report to the Ambulatory Surgical/Same Day Surgery Unit  • You will be instructed to stop at Registration only if you have not been pre-registered  • Inform your  fi they do not stay that they will be asked by the staff to leave a phone number where they can be reached  • Be available to be reached before surgery  In the event the operating room schedule changes, you may be asked to come in earlier or later than expected    *It is important to tell your doctor and others involved in your health care if you are taking or have been taking any non-prescription drugs, vitamins, minerals, herbals or other nutritional supplements  Any of these may interact with some food or medicines and cause a reaction      Pre-Surgery Instructions:   Medication Instructions   • albuterol (PROVENTIL HFA,VENTOLIN HFA) 90 mcg/act inhaler Uses PRN- OK to take day of surgery   • aspirin (ECOTRIN LOW STRENGTH) 81 mg EC tablet Stop taking 7 days prior to surgery  • atorvastatin (LIPITOR) 20 mg tablet Take night before surgery   • estradiol (ESTRACE) 1 mg tablet Hold day of surgery  • glimepiride (AMARYL) 2 mg tablet Hold day of surgery  • glucose blood test strip Take day of surgery  • latanoprost (XALATAN) 0 005 % ophthalmic solution Take night before surgery   • LORazepam (ATIVAN) 1 mg tablet Uses PRN- OK to take day of surgery   • metFORMIN (GLUCOPHAGE-XR) 500 mg 24 hr tablet Hold day of surgery     • Mirabegron ER (Myrbetriq) 50 MG TB24 Take night before surgery   • omeprazole (PriLOSEC) 40 MG capsule Take night before surgery

## 2023-03-31 ENCOUNTER — APPOINTMENT (OUTPATIENT)
Dept: LAB | Facility: HOSPITAL | Age: 68
End: 2023-03-31
Attending: SPECIALIST

## 2023-03-31 DIAGNOSIS — N39.41 URGE INCONTINENCE: ICD-10-CM

## 2023-03-31 DIAGNOSIS — N39.41 URGE URINARY INCONTINENCE: ICD-10-CM

## 2023-03-31 LAB
ANION GAP SERPL CALCULATED.3IONS-SCNC: 8 MMOL/L (ref 4–13)
ATRIAL RATE: 82 BPM
BASOPHILS # BLD AUTO: 0.08 THOUSANDS/ÂΜL (ref 0–0.1)
BASOPHILS NFR BLD AUTO: 1 % (ref 0–1)
BUN SERPL-MCNC: 13 MG/DL (ref 5–25)
CALCIUM SERPL-MCNC: 9.2 MG/DL (ref 8.4–10.2)
CHLORIDE SERPL-SCNC: 100 MMOL/L (ref 96–108)
CO2 SERPL-SCNC: 28 MMOL/L (ref 21–32)
CREAT SERPL-MCNC: 0.72 MG/DL (ref 0.6–1.3)
EOSINOPHIL # BLD AUTO: 0.38 THOUSAND/ÂΜL (ref 0–0.61)
EOSINOPHIL NFR BLD AUTO: 6 % (ref 0–6)
ERYTHROCYTE [DISTWIDTH] IN BLOOD BY AUTOMATED COUNT: 13.5 % (ref 11.6–15.1)
GFR SERPL CREATININE-BSD FRML MDRD: 88 ML/MIN/1.73SQ M
GLUCOSE P FAST SERPL-MCNC: 173 MG/DL (ref 65–99)
HCT VFR BLD AUTO: 38.2 % (ref 34.8–46.1)
HGB BLD-MCNC: 12.1 G/DL (ref 11.5–15.4)
IMM GRANULOCYTES # BLD AUTO: 0.01 THOUSAND/UL (ref 0–0.2)
IMM GRANULOCYTES NFR BLD AUTO: 0 % (ref 0–2)
LYMPHOCYTES # BLD AUTO: 1.77 THOUSANDS/ÂΜL (ref 0.6–4.47)
LYMPHOCYTES NFR BLD AUTO: 26 % (ref 14–44)
MCH RBC QN AUTO: 27.2 PG (ref 26.8–34.3)
MCHC RBC AUTO-ENTMCNC: 31.7 G/DL (ref 31.4–37.4)
MCV RBC AUTO: 86 FL (ref 82–98)
MONOCYTES # BLD AUTO: 0.54 THOUSAND/ÂΜL (ref 0.17–1.22)
MONOCYTES NFR BLD AUTO: 8 % (ref 4–12)
NEUTROPHILS # BLD AUTO: 3.95 THOUSANDS/ÂΜL (ref 1.85–7.62)
NEUTS SEG NFR BLD AUTO: 59 % (ref 43–75)
NRBC BLD AUTO-RTO: 0 /100 WBCS
P AXIS: 51 DEGREES
PLATELET # BLD AUTO: 317 THOUSANDS/UL (ref 149–390)
PMV BLD AUTO: 10 FL (ref 8.9–12.7)
POTASSIUM SERPL-SCNC: 4 MMOL/L (ref 3.5–5.3)
PR INTERVAL: 138 MS
QRS AXIS: 45 DEGREES
QRSD INTERVAL: 82 MS
QT INTERVAL: 392 MS
QTC INTERVAL: 457 MS
RBC # BLD AUTO: 4.45 MILLION/UL (ref 3.81–5.12)
SODIUM SERPL-SCNC: 136 MMOL/L (ref 135–147)
T WAVE AXIS: 44 DEGREES
VENTRICULAR RATE: 82 BPM
WBC # BLD AUTO: 6.73 THOUSAND/UL (ref 4.31–10.16)

## 2023-04-03 ENCOUNTER — HOSPITAL ENCOUNTER (OUTPATIENT)
Dept: RADIOLOGY | Age: 68
Discharge: HOME | End: 2023-04-03

## 2023-04-03 VITALS — WEIGHT: 235 LBS | HEIGHT: 65 IN | BODY MASS INDEX: 39.15 KG/M2

## 2023-04-03 DIAGNOSIS — Z12.31 SCREENING MAMMOGRAM FOR BREAST CANCER: ICD-10-CM

## 2023-04-05 NOTE — H&P
H&P Exam - Urology       Patient: Charmaine Parker   : 1955 Sex: female   MRN: [id number]     [facility]: [id number]      History of Present Illness   HPI:  Charmaine Parker is a 74 y o  female who presents with worsening urgency urgency incontinence failing medication therapy wishing to undergo Axonics neurostimulator probe insertion aware of the anesthesia infection bleeding disorders procedures as well as part 2 to be in a week from now        Review of Systems:   Constitutional:  Negative for activity change, fever, chills and diaphoresis  HENT: Negative for hearing loss and trouble swallowing  Eyes: Negative for itching and visual disturbance  Respiratory: Negative for chest tightness and shortness of breath  Cardiovascular: Negative for chest pain, edema  Gastrointestinal: Negative for abdominal distention, na abdominal pain, constipation, diarrhea, Nausea and vomiting  Genitourinary: Negative for decreased urine volume, difficulty urinating, dysuria, enuresis, frequency, hematuria and urgency  Musculoskeletal: Negative for gait problem and myalgias  Neurological: Negative for dizziness and headaches  Hematological: Does not bruise/bleed easily         Historical Information   Past Medical History:   Diagnosis Date   • Anxiety    • Asthma     asthma related, used inhaler 1 week ago   • James's esophagus    • Colon polyp    • Diabetes mellitus (HCC)     NIDDM   • Dysphagia     1 month ago started   • GERD (gastroesophageal reflux disease)    • Glaucoma    • Hyperlipidemia      Past Surgical History:   Procedure Laterality Date   • ABDOMINOPLASTY      also 2 abdominal hernia repairs at same time   • BREAST BIOPSY Left    •  SECTION     • COLONOSCOPY     • HYSTERECTOMY     • OOPHORECTOMY     • REDUCTION MAMMAPLASTY Bilateral    • UPPER GASTROINTESTINAL ENDOSCOPY     • US GUIDED BREAST BIOPSY LEFT COMPLETE Left 2016     Social History   Social History Substance and Sexual Activity   Alcohol Use Yes   • Alcohol/week: 3 0 standard drinks   • Types: 3 Glasses of wine per week    Comment: social, 2 x month     Social History     Substance and Sexual Activity   Drug Use Never     Social History     Tobacco Use   Smoking Status Former   • Types: Cigarettes   • Quit date:    • Years since quittin 2   Smokeless Tobacco Never   Tobacco Comments    quit 20 years ago     Family History:   Family History   Problem Relation Age of Onset   • Diabetes Mother    • Breast cancer Mother 62   • Colon cancer Father 54   • Diabetes Father    • No Known Problems Daughter    • No Known Problems Maternal Grandmother    • No Known Problems Maternal Grandfather    • No Known Problems Paternal Grandmother    • No Known Problems Paternal Grandfather    • Diabetes Brother    • Diabetes Brother    • Diabetes Brother    • Diabetes Brother    • Breast cancer Maternal Aunt 79   • No Known Problems Paternal Aunt    • No Known Problems Paternal Aunt    • No Known Problems Paternal Aunt    • No Known Problems Son        Meds/Allergies   No medications prior to admission  Allergies   Allergen Reactions   • Benzedrex [Propylhexedrine] Itching   • Wound Dressing Adhesive Rash       Objective   Vitals: There were no vitals taken for this visit  Physical Exam:  General Alert awake   Normocephalic atraumatic PERRLA  Lungs clear bilaterally  Cardiac normal S1 normal S2  Abdomen soft, flank pain  Extremities no edema    No intake/output data recorded      Invasive Devices     None                     Lab Results: CBC:   Lab Results   Component Value Date    WBC 6 73 2023    HGB 12 1 2023    HCT 38 2 2023    MCV 86 2023     2023    MCH 27 2 2023    MCHC 31 7 2023    RDW 13 5 2023    MPV 10 0 2023    NRBC 0 2023     CMP:   Lab Results   Component Value Date     2023    CO2 28 2023    BUN 13 2023 CREATININE 0 72 03/31/2023    CALCIUM 9 2 03/31/2023    EGFR 88 03/31/2023     Urinalysis:   Lab Results   Component Value Date    LEUKOCYTESUR Negative 09/16/2020    NITRITE Negative 09/16/2020    GLUCOSEU Negative 09/16/2020    KETONESU Negative 09/16/2020    BILIRUBINUR Negative 09/16/2020    BLOODU Negative 09/16/2020     Urine Culture: No results found for: URINECX  PSA: No results found for: PSA        Assessment/ Plan:  Part 1 Axonics neurostimulator insertion      Ning Clement MD

## 2023-04-06 ENCOUNTER — ANESTHESIA (OUTPATIENT)
Dept: PERIOP | Facility: HOSPITAL | Age: 68
End: 2023-04-06

## 2023-04-06 ENCOUNTER — ANESTHESIA EVENT (OUTPATIENT)
Dept: PERIOP | Facility: HOSPITAL | Age: 68
End: 2023-04-06

## 2023-04-06 ENCOUNTER — APPOINTMENT (OUTPATIENT)
Dept: RADIOLOGY | Facility: HOSPITAL | Age: 68
End: 2023-04-06

## 2023-04-06 ENCOUNTER — HOSPITAL ENCOUNTER (OUTPATIENT)
Facility: HOSPITAL | Age: 68
Setting detail: OUTPATIENT SURGERY
Discharge: HOME | End: 2023-04-06
Attending: SPECIALIST | Admitting: SPECIALIST

## 2023-04-06 VITALS
SYSTOLIC BLOOD PRESSURE: 142 MMHG | HEIGHT: 65 IN | OXYGEN SATURATION: 100 % | WEIGHT: 239.4 LBS | TEMPERATURE: 97.3 F | DIASTOLIC BLOOD PRESSURE: 71 MMHG | RESPIRATION RATE: 16 BRPM | HEART RATE: 78 BPM | BODY MASS INDEX: 39.89 KG/M2

## 2023-04-06 DIAGNOSIS — N39.41 URGE INCONTINENCE: ICD-10-CM

## 2023-04-06 LAB
GLUCOSE SERPL-MCNC: 189 MG/DL (ref 65–140)
GLUCOSE SERPL-MCNC: 199 MG/DL (ref 65–140)

## 2023-04-06 DEVICE — PERCUTANEOUS EXTENSION
Type: IMPLANTABLE DEVICE | Site: FLANK | Status: FUNCTIONAL
Brand: AXONICS

## 2023-04-06 DEVICE — TINED LEAD KIT
Type: IMPLANTABLE DEVICE | Site: FLANK | Status: FUNCTIONAL
Brand: AXONICS

## 2023-04-06 RX ORDER — SODIUM CHLORIDE, SODIUM LACTATE, POTASSIUM CHLORIDE, CALCIUM CHLORIDE 600; 310; 30; 20 MG/100ML; MG/100ML; MG/100ML; MG/100ML
INJECTION, SOLUTION INTRAVENOUS CONTINUOUS PRN
Status: DISCONTINUED | OUTPATIENT
Start: 2023-04-06 | End: 2023-04-06

## 2023-04-06 RX ORDER — MIDAZOLAM HYDROCHLORIDE 2 MG/2ML
INJECTION, SOLUTION INTRAMUSCULAR; INTRAVENOUS AS NEEDED
Status: DISCONTINUED | OUTPATIENT
Start: 2023-04-06 | End: 2023-04-06

## 2023-04-06 RX ORDER — PROPOFOL 10 MG/ML
INJECTION, EMULSION INTRAVENOUS CONTINUOUS PRN
Status: DISCONTINUED | OUTPATIENT
Start: 2023-04-06 | End: 2023-04-06

## 2023-04-06 RX ORDER — LIDOCAINE HYDROCHLORIDE AND EPINEPHRINE 10; 10 MG/ML; UG/ML
INJECTION, SOLUTION INFILTRATION; PERINEURAL AS NEEDED
Status: DISCONTINUED | OUTPATIENT
Start: 2023-04-06 | End: 2023-04-06 | Stop reason: HOSPADM

## 2023-04-06 RX ORDER — FENTANYL CITRATE/PF 50 MCG/ML
25 SYRINGE (ML) INJECTION
Status: DISCONTINUED | OUTPATIENT
Start: 2023-04-06 | End: 2023-04-06 | Stop reason: HOSPADM

## 2023-04-06 RX ORDER — SODIUM CHLORIDE, SODIUM LACTATE, POTASSIUM CHLORIDE, CALCIUM CHLORIDE 600; 310; 30; 20 MG/100ML; MG/100ML; MG/100ML; MG/100ML
75 INJECTION, SOLUTION INTRAVENOUS CONTINUOUS
Status: DISCONTINUED | OUTPATIENT
Start: 2023-04-06 | End: 2023-04-06 | Stop reason: HOSPADM

## 2023-04-06 RX ORDER — FENTANYL CITRATE 50 UG/ML
INJECTION, SOLUTION INTRAMUSCULAR; INTRAVENOUS AS NEEDED
Status: DISCONTINUED | OUTPATIENT
Start: 2023-04-06 | End: 2023-04-06

## 2023-04-06 RX ORDER — PROPOFOL 10 MG/ML
INJECTION, EMULSION INTRAVENOUS AS NEEDED
Status: DISCONTINUED | OUTPATIENT
Start: 2023-04-06 | End: 2023-04-06

## 2023-04-06 RX ORDER — MAGNESIUM HYDROXIDE 1200 MG/15ML
LIQUID ORAL AS NEEDED
Status: DISCONTINUED | OUTPATIENT
Start: 2023-04-06 | End: 2023-04-06 | Stop reason: HOSPADM

## 2023-04-06 RX ORDER — KETAMINE HYDROCHLORIDE 50 MG/ML
INJECTION, SOLUTION, CONCENTRATE INTRAMUSCULAR; INTRAVENOUS AS NEEDED
Status: DISCONTINUED | OUTPATIENT
Start: 2023-04-06 | End: 2023-04-06

## 2023-04-06 RX ADMIN — KETAMINE HYDROCHLORIDE 10 MG: 50 INJECTION INTRAMUSCULAR; INTRAVENOUS at 11:15

## 2023-04-06 RX ADMIN — PROPOFOL 100 MCG/KG/MIN: 10 INJECTION, EMULSION INTRAVENOUS at 10:52

## 2023-04-06 RX ADMIN — FENTANYL CITRATE 50 MCG: 50 INJECTION, SOLUTION INTRAMUSCULAR; INTRAVENOUS at 10:48

## 2023-04-06 RX ADMIN — PROPOFOL 20 MG: 10 INJECTION, EMULSION INTRAVENOUS at 11:30

## 2023-04-06 RX ADMIN — KETAMINE HYDROCHLORIDE 10 MG: 50 INJECTION INTRAMUSCULAR; INTRAVENOUS at 11:30

## 2023-04-06 RX ADMIN — PROPOFOL 20 MG: 10 INJECTION, EMULSION INTRAVENOUS at 12:18

## 2023-04-06 RX ADMIN — SODIUM CHLORIDE, SODIUM LACTATE, POTASSIUM CHLORIDE, AND CALCIUM CHLORIDE: .6; .31; .03; .02 INJECTION, SOLUTION INTRAVENOUS at 10:34

## 2023-04-06 RX ADMIN — PROPOFOL 30 MG: 10 INJECTION, EMULSION INTRAVENOUS at 11:01

## 2023-04-06 RX ADMIN — SODIUM CHLORIDE 3 G: 9 INJECTION, SOLUTION INTRAVENOUS at 10:12

## 2023-04-06 RX ADMIN — MIDAZOLAM 2 MG: 1 INJECTION INTRAMUSCULAR; INTRAVENOUS at 10:48

## 2023-04-06 RX ADMIN — FENTANYL CITRATE 25 MCG: 50 INJECTION, SOLUTION INTRAMUSCULAR; INTRAVENOUS at 11:01

## 2023-04-06 RX ADMIN — PROPOFOL 50 MG: 10 INJECTION, EMULSION INTRAVENOUS at 10:52

## 2023-04-06 RX ADMIN — FENTANYL CITRATE 25 MCG: 50 INJECTION, SOLUTION INTRAMUSCULAR; INTRAVENOUS at 11:10

## 2023-04-06 RX ADMIN — KETAMINE HYDROCHLORIDE 30 MG: 50 INJECTION INTRAMUSCULAR; INTRAVENOUS at 11:01

## 2023-04-06 NOTE — PROGRESS NOTES
"Progress Note - Urology      Patient: Raquel Morales   : 1955 Sex: female   MRN: [id number]     CSN: [id number]  Unit/Bed#: OR POOL     SUBJECTIVE:   Patient in the surgical preop unit  Aware risk of anesthesia infection bleeding additional urology procedures      Objective   Vitals: /81   Pulse 93   Temp (!) 97 4 °F (36 3 °C) (Temporal)   Resp 18   Ht 5' 5\" (1 651 m)   Wt 109 kg (239 lb 6 4 oz)   SpO2 100%   BMI 39 84 kg/m²     No intake/output data recorded        Physical Exam:   General Alert awake   Normocephalic atraumatic PERRLA  Lungs clear bilaterally  Cardiac normal S1 normal S2  Abdomen soft, flank pain  Extremities no edema      Lab Results: CBC:   Lab Results   Component Value Date    WBC 6 73 2023    HGB 12 1 2023    HCT 38 2 2023    MCV 86 2023     2023    MCH 27 2 2023    MCHC 31 7 2023    RDW 13 5 2023    MPV 10 0 2023    NRBC 0 2023     CMP:   Lab Results   Component Value Date     2023    CO2 28 2023    BUN 13 2023    CREATININE 0 72 2023    CALCIUM 9 2 2023    EGFR 88 2023     Urinalysis:   Lab Results   Component Value Date    LEUKOCYTESUR Negative 2020    NITRITE Negative 2020    GLUCOSEU Negative 2020    KETONESU Negative 2020    BILIRUBINUR Negative 2020    BLOODU Negative 2020     Urine Culture: No results found for: URINECX  PSA: No results found for: PSA      Assessment/ Plan:  Part 1 Axonics neurostimulator          Elizabet Santiago MD  "

## 2023-04-06 NOTE — OP NOTE
OPERATIVE REPORT  PATIENT NAME: José Rowe    :  1955  MRN: [id number]  Pt Location: [facility] OR ROOM 03    SURGERY DATE: 2023    Surgeon(s) and Role:     * Helen Willoughby MD - Primary    Preop Diagnosis:  Urge incontinence [N39 41]    Post-Op Diagnosis Codes:     * Urge incontinence [N39 41]    Procedure(s):  PART 1  INCISION FOR IMPLANTATION OF NEUROSTIMULATOR  ELECTRONIC ANALYSIS OF NEUROSTIMULATOR    Specimen(s):  * No specimens in log *    Estimated Blood Loss:   Minimal    Drains:  * No LDAs found *    Anesthesia Type:   IV Sedation with Anesthesia    Operative Indications:  Urge incontinence [N39 41]  This 82-year-old female seen in the office with worsening overactive bladder failing behavioral therapy and medication therapy now wishes to undergo Axonics neurostimulator insertion aware of the 2 part procedure aware of the risk of anesthesia infection bleeding additionally was procedures    Operative Findings:  Probe placed into right S3 excellent buttocks and toe flexion    Complications:   None    Procedure and Technique:  After surgical consent was reviewed with the patient including risk benefits and alternatives the patient expressed desire to proceed with Axonics probe insertion aware that is a 2 part procedure  Consent was obtained she was taken into the operating room and placed in the prone position  Pillows were placed in the lower abdomen and hips to the level and flatten the sacrum and allow the toes to dangle freely  Intravenous Unasyn was infused 30 minutes prior to incision for infection prophylaxis  A ground pad was placed in the bottom of the patient's foot and was connected to the clinical  along with the test stimulation cable  The buttocks was easily exposed the patient was draped and prepped in sterile fashion  Timeout performed after MAC anesthesia was induced  Preoperative fluoroscopy was performed to visualize the sacral anatomy      Attention was turned to the placement of the lead wire using the [facility] system  The level of the right S3 medial border of the sacral foramen was identified bilaterally using fluoroscopic AP views  After administering of 10 cc of lidocaine to both the right and left S3 foramen area a foramen needle was initially placed in the right superior medial aspect of the S3 foramen with difficulty not reaching into the prone position this was then moved to the left side twice again with poor placement of the wire and response once checked with bowing the needle was then placed again on the right side a lateral fluoroscopic image was then obtained to ensure that the needle entry point was approximately 1 cm above the parallel to the fusion plate of S3  The needle was advanced such that the tip of the foramen needle was just at the anterior aspect of the sacrum  The J hook was then placed on the on insulated portion in the foramen needle and stimulator applied to obtain thresholds there was noted to be positive laquita response followed by great toe flexion  At the end several attempts with needle placement required to obtain the ideal response to on the right side and 3 on the left side finally settling for the right side once the ideal location was confirmed a small incision was made at the foramen needle to accommodate the lead wire and tunneling  The directional guide was placed through the foramen needle and the needle removed  The introducer was placed over the directional guide and advanced under live fluoroscopy such that the radiopaque marker was placed USP through the sacral plate  The dilator was removed along with the directional guide using live fluoroscopy the tined leads with the curved stylette was placed through the introducer until electrode 3 straddled the anterior surface of the sacral InSu the lead had a gentle downward trajectory    The stimulation clip was then placed on the lead tested after satisfactory lead positioning was confirmed this tissue was retracted over the lead under continuous fluoroscopy deploying the tined into the presacral space  The stimulation thresholds were established using the least amount of stimulation    A tunneling tool was then placed with overlying plastic sheath from the lead insertion site subcutaneously to the location of the INS pocket which was marked under fluoroscopic control to be 2 and half centimeters over the true hollow of the ileum  Administer of 5 cc of lidocaine over the incision site and the incision was opened bluntly dissected to create a pocket  The tunneling tool was then placed through the incision site and the sharp tip of the tunneling tool was removed and the lead was fed through the sheath exiting the pocket site  The sheath was removed  The lead was then cleaned and dried and connected to the OffSite VISION percutaneous extension with a single set screw which was tightened using the torque wrench  Percutaneous tension was then tunneled to the left side just above 6 cm above the lower sacrum and exited through the skin puncture site  The percutaneous tension was then connected to the external pulse generator  Copious amounts of Keflex irrigation were then given to the new right buttocks pocket and the incision was closed with 2-0 Vicryl interrupted subcu sutures followed by staples 2-0 chromic sutures were placed into the 2 left-sided puncture sites and 2 right-sided puncture sites the external wire was and sutured in with 2-0 Vicryl connected to the stimulator adhesive strips and gauze were placed patient to procedure awakened taken recovery in stable condition     I was present for the entire procedure    Patient Disposition:  PACU         SIGNATURE: Izzy Neal MD  DATE: April 6, 2023  TIME: 10:49 AM

## 2023-04-06 NOTE — DISCHARGE INSTR - AVS FIRST PAGE
#1 no heavy straining or lifting above 10 pounds for 2 weeks    #2 call office fevers, chills, or worsening blood in the urine  #3 patient has follow-up with Dr Jany Hoff Tuesday, April 11 3:45 PM to discuss results and preop report to next Thursday  Keep sterile dressing on May shower on Saturday no hot tub pool do not remove dressing      Jani SHAW  [address] [facility] office  [address] [place] [address]  [phone]  8:30 AM to 4:30 PM  Monday through Friday    [place] office  [phone]  [place], [address] [place] [address]  [phone]  1:00 to 5:00 PM  Wednesday

## 2023-04-06 NOTE — ANESTHESIA PREPROCEDURE EVALUATION
Procedure:  PART 1, INCISION FOR IMPLANTATION OF NEUROSTIMULATOR, ELECTRONIC ANALYSIS OF NEUROSTIMULATOR (Bladder)    Relevant Problems   ENDO   (+) Diabetes 1 5, managed as type 2 (HCC)   (+) Dyslipidemia associated with type 2 diabetes mellitus (HCC)   (+) Type 2 diabetes mellitus, without long-term current use of insulin (HCC)      GI/HEPATIC   (+) Gastroesophageal reflux disease without esophagitis      GYN   (+) History of total hysterectomy with bilateral salpingo-oophorectomy (BSO)      HEMATOLOGY   (+) Iron deficiency anemia      NEURO/PSYCH   (+) Generalized anxiety disorder   (+) H/O psoriasis   (+) Hx of adenomatous colonic polyps      PULMONARY   (+) Mild intermittent asthma        Physical Exam    Airway    Mallampati score: II  TM Distance: >3 FB  Neck ROM: full     Dental   No notable dental hx     Cardiovascular  Cardiovascular exam normal    Pulmonary  Pulmonary exam normal     Other Findings        Anesthesia Plan  ASA Score- 3     Anesthesia Type- IV sedation with anesthesia with ASA Monitors  Additional Monitors:   Airway Plan:           Plan Factors-Exercise tolerance (METS): >4 METS  Chart reviewed  Imaging results reviewed  Existing labs reviewed  Patient summary reviewed  Patient is not a current smoker  Induction-     Postoperative Plan-     Informed Consent- Anesthetic plan and risks discussed with patient  I personally reviewed this patient with the CRNA  Discussed and agreed on the Anesthesia Plan with the CRNA  Be Escobar

## 2023-04-06 NOTE — PERIOPERATIVE NURSING NOTE
Minimal drainage on dressing  Dr Peng Mcbride notified, okay for discharge  Patient having slight discomfort related to chronic pain  Tolerating PO fluids  IV access removed  Prescriptions and AVS reviewed with patient   All questions and concerns addressed  Patient discharged off unit via wheelchair

## 2023-04-13 ENCOUNTER — ANESTHESIA (OUTPATIENT)
Dept: PERIOP | Facility: HOSPITAL | Age: 68
End: 2023-04-13

## 2023-04-13 ENCOUNTER — ANESTHESIA EVENT (OUTPATIENT)
Dept: PERIOP | Facility: HOSPITAL | Age: 68
End: 2023-04-13

## 2023-04-13 RX ORDER — ONDANSETRON 2 MG/ML
INJECTION INTRAMUSCULAR; INTRAVENOUS AS NEEDED
Status: DISCONTINUED | OUTPATIENT
Start: 2023-04-13 | End: 2023-04-13

## 2023-04-13 RX ORDER — SODIUM CHLORIDE, SODIUM LACTATE, POTASSIUM CHLORIDE, CALCIUM CHLORIDE 600; 310; 30; 20 MG/100ML; MG/100ML; MG/100ML; MG/100ML
INJECTION, SOLUTION INTRAVENOUS CONTINUOUS PRN
Status: DISCONTINUED | OUTPATIENT
Start: 2023-04-13 | End: 2023-04-13

## 2023-04-13 RX ORDER — PROPOFOL 10 MG/ML
INJECTION, EMULSION INTRAVENOUS CONTINUOUS PRN
Status: DISCONTINUED | OUTPATIENT
Start: 2023-04-13 | End: 2023-04-13

## 2023-04-13 RX ORDER — LIDOCAINE HYDROCHLORIDE 10 MG/ML
INJECTION, SOLUTION EPIDURAL; INFILTRATION; INTRACAUDAL; PERINEURAL AS NEEDED
Status: DISCONTINUED | OUTPATIENT
Start: 2023-04-13 | End: 2023-04-13

## 2023-04-13 RX ORDER — MIDAZOLAM HYDROCHLORIDE 2 MG/2ML
INJECTION, SOLUTION INTRAMUSCULAR; INTRAVENOUS AS NEEDED
Status: DISCONTINUED | OUTPATIENT
Start: 2023-04-13 | End: 2023-04-13

## 2023-04-13 RX ORDER — FENTANYL CITRATE 50 UG/ML
INJECTION, SOLUTION INTRAMUSCULAR; INTRAVENOUS AS NEEDED
Status: DISCONTINUED | OUTPATIENT
Start: 2023-04-13 | End: 2023-04-13

## 2023-04-13 RX ADMIN — ONDANSETRON 4 MG: 2 INJECTION INTRAMUSCULAR; INTRAVENOUS at 09:45

## 2023-04-13 RX ADMIN — PROPOFOL 100 MCG/KG/MIN: 10 INJECTION, EMULSION INTRAVENOUS at 09:37

## 2023-04-13 RX ADMIN — MIDAZOLAM 2 MG: 1 INJECTION INTRAMUSCULAR; INTRAVENOUS at 09:33

## 2023-04-13 RX ADMIN — SODIUM CHLORIDE 3 G: 9 INJECTION, SOLUTION INTRAVENOUS at 09:33

## 2023-04-13 RX ADMIN — FENTANYL CITRATE 100 MCG: 50 INJECTION, SOLUTION INTRAMUSCULAR; INTRAVENOUS at 09:37

## 2023-04-13 RX ADMIN — LIDOCAINE HYDROCHLORIDE 5 ML: 10 INJECTION, SOLUTION EPIDURAL; INFILTRATION; INTRACAUDAL; PERINEURAL at 09:37

## 2023-04-13 RX ADMIN — SODIUM CHLORIDE, SODIUM LACTATE, POTASSIUM CHLORIDE, AND CALCIUM CHLORIDE: .6; .31; .03; .02 INJECTION, SOLUTION INTRAVENOUS at 09:33

## 2023-04-13 NOTE — ANESTHESIA POSTPROCEDURE EVALUATION
Post-Op Assessment Note    CV Status:  Stable       Mental Status:  Sleepy   Hydration Status:  Stable   PONV Controlled:  Controlled   Airway Patency:  Patent      Post Op Vitals Reviewed: Yes      Staff: CRNA         No notable events documented      /71 (04/13/23 1023)    Temp      Pulse 87 (04/13/23 1023)   Resp   20   SpO2   96

## 2023-04-13 NOTE — ANESTHESIA PREPROCEDURE EVALUATION
Procedure:  STAGE 2 AXONIC, INSERTION OF NEUROSTIMULATOR, ELECTRONIC ANALYSIS OF NEUROSTIMULATOR (Back)    Relevant Problems   ENDO   (+) Diabetes 1 5, managed as type 2 (HCC)   (+) Dyslipidemia associated with type 2 diabetes mellitus (HCC)   (+) Type 2 diabetes mellitus, without long-term current use of insulin (HCC)      GI/HEPATIC   (+) Gastroesophageal reflux disease without esophagitis      GYN   (+) History of total hysterectomy with bilateral salpingo-oophorectomy (BSO)      HEMATOLOGY   (+) Iron deficiency anemia      NEURO/PSYCH   (+) Generalized anxiety disorder   (+) H/O psoriasis   (+) Hx of adenomatous colonic polyps      PULMONARY   (+) Mild intermittent asthma        Physical Exam    Airway    Mallampati score: II  TM Distance: >3 FB  Neck ROM: full     Dental   No notable dental hx     Cardiovascular  Cardiovascular exam normal    Pulmonary  Pulmonary exam normal     Other Findings        Anesthesia Plan  ASA Score- 2     Anesthesia Type- IV sedation with anesthesia with ASA Monitors  Additional Monitors:   Airway Plan:           Plan Factors-Exercise tolerance (METS): >4 METS  Chart reviewed  Imaging results reviewed  Existing labs reviewed  Patient summary reviewed  Patient is not a current smoker  Induction-     Postoperative Plan-     Informed Consent- Anesthetic plan and risks discussed with patient  I personally reviewed this patient with the CRNA  Discussed and agreed on the Anesthesia Plan with the CRNA  Hugo Tamayo

## 2023-04-27 DIAGNOSIS — L72.9 FOLLICULAR CYST OF SKIN AND SUBCUTANEOUS TISSUE: Primary | ICD-10-CM

## 2023-04-28 RX ORDER — CEPHALEXIN 500 MG/1
500 CAPSULE ORAL EVERY 12 HOURS SCHEDULED
Qty: 14 CAPSULE | Refills: 0 | Status: SHIPPED | OUTPATIENT
Start: 2023-04-28 | End: 2023-05-05

## 2023-08-28 DIAGNOSIS — R11.0 NAUSEA: Primary | ICD-10-CM

## 2023-08-28 RX ORDER — FAMOTIDINE 40 MG/1
40 TABLET, FILM COATED ORAL
Qty: 30 TABLET | Refills: 1 | Status: SHIPPED | OUTPATIENT
Start: 2023-08-28 | End: 2023-09-27

## 2023-08-28 RX ORDER — ONDANSETRON 4 MG/1
4 TABLET, FILM COATED ORAL EVERY 8 HOURS PRN
Qty: 90 TABLET | Refills: 0 | Status: SHIPPED | OUTPATIENT
Start: 2023-08-28

## 2023-08-28 NOTE — TELEPHONE ENCOUNTER
Pt last seen 10/2022 with hx of abdominal pain, diarrhea, nausea, GERD, and barretts. Pt has been experiencing nausea and some vomiting for the last week. Pt unsure what triggered this episode but she wakes up daily with nausea and has had one vomiting episode every other day. She denies blood in vomit. Denies abnormal bms. Denies signs of dehydration. Pt takes her omeprazole daily at bedtime. She has been using tums with no relief. Pt has used zofran in the past with relief. Pt also mentioned having some worsening GERD d/t vomiting. Advised pt to stay hydrated, consume bland diet, and to monitor symptoms. We discussed what symptoms would prompt ED evaluation. I instructed pt to take omeprazole in the morning at least 30 min before breakfast and I explained trial of pepcid 40 mg at bedtime. Pt agreeable. Pt also agreeable to zofran. I stated I would have pepcid and zofran sent to pts pharmacy. We also scheduled pt for OV as she has not been seen in almost one year. Lastly, I advised pt to call back if symptoms worsen.

## 2023-09-02 DIAGNOSIS — K21.9 GASTROESOPHAGEAL REFLUX DISEASE WITHOUT ESOPHAGITIS: ICD-10-CM

## 2023-09-05 RX ORDER — OMEPRAZOLE 40 MG/1
40 CAPSULE, DELAYED RELEASE ORAL DAILY
Qty: 90 CAPSULE | Refills: 3 | Status: SHIPPED | OUTPATIENT
Start: 2023-09-05

## 2023-09-06 ENCOUNTER — TELEPHONE (OUTPATIENT)
Age: 68
End: 2023-09-06

## 2023-09-06 NOTE — TELEPHONE ENCOUNTER
Reason for call:   [] Refill   [] Prior Auth  [x] Other:     Office:   [] PCP/Provider -   [x] Speciality/Provider - [facility]    Medication: Prilosec 40 mg    Patient called to check on medication refill, It shows it was sent yesterday, patient will call pharmacy today.

## 2023-09-08 ENCOUNTER — TELEPHONE (OUTPATIENT)
Dept: GYNECOLOGY | Facility: CLINIC | Age: 68
End: 2023-09-08

## 2023-09-08 NOTE — TELEPHONE ENCOUNTER
The patient called because she is having dysuria that started yesterday. She has no pelvic cramping and no bleeding. We would not be able to see the patient until Monday so I advised the patient to take Azo to help relieve the symptoms.  The patient said she will call us on Monday if the symptoms persist.

## 2023-09-21 ENCOUNTER — OFFICE VISIT (OUTPATIENT)
Dept: GYNECOLOGY | Facility: CLINIC | Age: 68
End: 2023-09-21
Payer: MEDICARE

## 2023-09-21 VITALS
DIASTOLIC BLOOD PRESSURE: 64 MMHG | HEIGHT: 65 IN | BODY MASS INDEX: 40.98 KG/M2 | SYSTOLIC BLOOD PRESSURE: 124 MMHG | WEIGHT: 246 LBS

## 2023-09-21 DIAGNOSIS — N30.01 ACUTE CYSTITIS WITH HEMATURIA: Primary | ICD-10-CM

## 2023-09-21 DIAGNOSIS — R30.0 DYSURIA: ICD-10-CM

## 2023-09-21 LAB
SL AMB  POCT GLUCOSE, UA: 2000
SL AMB LEUKOCYTE ESTERASE,UA: ABNORMAL
SL AMB POCT BILIRUBIN,UA: NEGATIVE
SL AMB POCT BLOOD,UA: ABNORMAL
SL AMB POCT CLARITY,UA: CLEAR
SL AMB POCT COLOR,UA: YELLOW
SL AMB POCT KETONES,UA: NEGATIVE
SL AMB POCT NITRITE,UA: NEGATIVE
SL AMB POCT PH,UA: 5
SL AMB POCT SPECIFIC GRAVITY,UA: 1000
SL AMB POCT URINE PROTEIN: NEGATIVE
SL AMB POCT UROBILINOGEN: NORMAL

## 2023-09-21 PROCEDURE — 81002 URINALYSIS NONAUTO W/O SCOPE: CPT | Performed by: OBSTETRICS & GYNECOLOGY

## 2023-09-21 PROCEDURE — 99212 OFFICE O/P EST SF 10 MIN: CPT | Performed by: OBSTETRICS & GYNECOLOGY

## 2023-09-21 RX ORDER — CIPROFLOXACIN 250 MG/1
250 TABLET, FILM COATED ORAL EVERY 12 HOURS SCHEDULED
Qty: 14 TABLET | Refills: 0 | Status: SHIPPED | OUTPATIENT
Start: 2023-09-21 | End: 2023-09-28

## 2023-09-21 NOTE — PROGRESS NOTES
The office complaining of dysuria and frequency for approximately a week and a half. Denies any fevers or chills. Is any hematuria. Also experiencing intercourse during the same timeframe. Status post placement of a nerve stimulator in her lower back for urinary urgency. This seems to be working well. Patient does not get up at night. Stops drinking liquids after 7 PM.    That is post hysterectomy for endometriosis. On estradiol daily. UA dip positive blood positive leukocytes and large sugar. Patient's blood sugars have not been well controlled. Recommendation was medication for weight loss but according the patient it is $600 for weekly injections. Physical exam: No CVA tenderness or suprapubic tenderness. External genitalia is atrophic. Vagina clear. No evidence of blood. No vaginal discharge. Impression: UTI. Plan: Shaji Cipro 250 mg twice daily x7 days. Call with update next week. Increase fluids. Try to follow diabetic diet. Follow-up with family physician to discuss oral medication for weight loss.

## 2023-09-25 ENCOUNTER — TELEPHONE (OUTPATIENT)
Dept: GASTROENTEROLOGY | Facility: CLINIC | Age: 68
End: 2023-09-25

## 2023-09-25 ENCOUNTER — OFFICE VISIT (OUTPATIENT)
Dept: GASTROENTEROLOGY | Facility: CLINIC | Age: 68
End: 2023-09-25
Payer: MEDICARE

## 2023-09-25 VITALS
SYSTOLIC BLOOD PRESSURE: 122 MMHG | HEIGHT: 65 IN | DIASTOLIC BLOOD PRESSURE: 80 MMHG | BODY MASS INDEX: 41.15 KG/M2 | WEIGHT: 247 LBS | HEART RATE: 85 BPM

## 2023-09-25 DIAGNOSIS — K21.9 GASTROESOPHAGEAL REFLUX DISEASE WITHOUT ESOPHAGITIS: Primary | ICD-10-CM

## 2023-09-25 DIAGNOSIS — K58.0 IRRITABLE BOWEL SYNDROME WITH DIARRHEA: ICD-10-CM

## 2023-09-25 DIAGNOSIS — R13.10 DYSPHAGIA, UNSPECIFIED TYPE: ICD-10-CM

## 2023-09-25 DIAGNOSIS — Z86.010 HX OF ADENOMATOUS COLONIC POLYPS: ICD-10-CM

## 2023-09-25 DIAGNOSIS — K22.70 BARRETT'S ESOPHAGUS WITHOUT DYSPLASIA: ICD-10-CM

## 2023-09-25 DIAGNOSIS — Z12.11 COLON CANCER SCREENING: ICD-10-CM

## 2023-09-25 PROCEDURE — 99214 OFFICE O/P EST MOD 30 MIN: CPT | Performed by: NURSE PRACTITIONER

## 2023-09-25 RX ORDER — MOMETASONE FUROATE 1 MG/G
OINTMENT TOPICAL
COMMUNITY
Start: 2023-06-30

## 2023-09-25 RX ORDER — FLUTICASONE PROPIONATE 50 MCG
2 SPRAY, SUSPENSION (ML) NASAL DAILY
COMMUNITY
Start: 2023-05-15 | End: 2023-10-02

## 2023-09-25 RX ORDER — KETOROLAC TROMETHAMINE 5 MG/ML
SOLUTION OPHTHALMIC
COMMUNITY
Start: 2023-08-09

## 2023-09-25 RX ORDER — PREDNISOLONE ACETATE 10 MG/ML
SUSPENSION/ DROPS OPHTHALMIC
COMMUNITY
Start: 2023-08-09

## 2023-09-25 RX ORDER — LATANOPROST 50 UG/ML
SOLUTION/ DROPS OPHTHALMIC
COMMUNITY
Start: 2023-07-07

## 2023-09-25 RX ORDER — POLYMYXIN B SULFATE AND TRIMETHOPRIM 1; 10000 MG/ML; [USP'U]/ML
SOLUTION OPHTHALMIC
COMMUNITY
Start: 2023-08-09 | End: 2023-10-02

## 2023-09-25 RX ORDER — OMEPRAZOLE 40 MG/1
40 CAPSULE, DELAYED RELEASE ORAL 2 TIMES DAILY
Qty: 180 CAPSULE | Refills: 2 | Status: SHIPPED | OUTPATIENT
Start: 2023-09-25 | End: 2023-12-24

## 2023-09-25 RX ORDER — CEPHALEXIN 500 MG/1
CAPSULE ORAL
COMMUNITY
Start: 2023-07-25 | End: 2023-10-02

## 2023-09-25 NOTE — H&P (VIEW-ONLY)
[facility] - Outpatient Follow-up Note  Richard Alanis 79 y.o. female MRN: [id number]  Encounter: [id number]          ASSESSMENT AND PLAN:      1. Hx of adenomatous colonic polyps  2. Colon cancer screening  Patient has history of colon polyps. Colonoscopy done 09/06/2022 which showed 2 colon polyps removed. Polyps were positive for tubular adenomas. Colon cancer screening up-to-date. 3. Diarrhea  Patient reports diarrhea has resolved. Bowel patterns are regular. Denies blood in stool, blood from rectal area, or black tarry stool. 4. Gastroesophageal reflux disease without esophagitis  5. James's esophagus without dysplasia  Patient has history of James's esophagus and GERD. Patient continues to have breakthrough symptoms of acid reflux. Patient reports she is currently following antireflux diet and measures but symptoms still remain uncontrolled. Patient's acid reflux currently is not controlled with Pepcid at bedtime and omeprazole daily in morning. She does have nocturnal symptoms approximately 1 time a week. -Discontinue famotidine at bedtime  - Increase Omeprazole (PriLOSEC) 40 MG capsule; Take 1 capsule (40 mg total) by mouth 2 (two) times a day Take half hour prior to breakfast and half hour prior to dinner  Dispense: 180 capsule; Refill: 2  - Continue antireflux diet and measures    6. Dysphagia, unspecified type  Patient reports dysphagia to pills. Patient reports she has to break her pills in order to get him down. Patient states that she feels like the pills get stuck in her chest. Patient denies dysphagia to solids or liquids. - EGD; scheduled for EGD. Prep and procedure explained to patient in detail. Further recommendations pending results of EGD. - omeprazole (PriLOSEC) 40 MG capsule; Take 1 capsule (40 mg total) by mouth 2 (two) times a day Take half hour prior to breakfast and half hour prior to dinner  Dispense: 180 capsule;  Refill: 2  ______________________________________________________________________    SUBJECTIVE:  Darcy Nix is a 72-year-old female who presents to office for follow-up. Patient reports that diarrhea she previously experienced has since resolved. Bowel patterns are normal.Denies blood in stool, blood from rectal area, or black tarry stool. Stool studies were negative for underlying infection. Patient reports dysphagia to pills. Patient reports she has to break her pills in order to get him down. Patient states that she feels like the pills get stuck in her chest. Patient denies dysphagia to solids or liquids. Patient has history of James's esophagus and GERD. Patient continues to have breakthrough symptoms of acid reflux. Patient reports she is currently following antireflux diet and measures but symptoms still remain uncontrolled. Patient's acid reflux currently is not controlled with Pepcid at bedtime and omeprazole daily in morning. She does have nocturnal symptoms approximately 1 time a week. Patient denies nausea, vomiting, epigastric or lower abdominal pain. Patient is tender to palpation in left upper quadrant. Positive family history colon cancer father diagnosed age 54. Patient is a former smoker she quit in [address] [facility]. Results of recent EGD and colonoscopy and biopsy results reviewed with patient. Colonoscopy done 09/06/2022 which showed 2 colon polyps removed. Polyps were positive for tubular adenomas, no high-grade dysplasia or malignancy. EGD done 09/06/2022 showed some equivocal lymphocytes in the duodenum with tTG of 4 in the past which is equivocal, repeat biopsies pending, mild antritis, Barretts esophagus, evaluate for EOE. Biopsy of duodenum benign small intestinal mucosa and no evidence of increased intraepithelial lymphocytes. Stomach biopsy no H pylori, no ulceration, no dysplasia or malignancy. Mild chronic inflammation consistent with reactive/chemical gastritis.   EG junction biopsy negative goblet cell metaplasia, no dysplasia or malignancy. Moderate chronic inflammation with reactive epithelial changes. Gastroesophageal junction mucosa with cardiac type glands present in-between squamous mucosa (esophageal columnar metaplasia). Esophagus biopsy benign squamous mucosa with no pathological changes. No evidence of eosinophilic esophagitis. No intestinal metaplasia, dysplasia, or malignancy. REVIEW OF SYSTEMS IS OTHERWISE NEGATIVE.       Historical Information   Past Medical History:   Diagnosis Date   • Anxiety    • Asthma     asthma related, used inhaler 1 week ago   • James's esophagus    • Colon polyp    • Diabetes mellitus (HCC)     NIDDM   • Dysphagia     1 month ago started   • GERD (gastroesophageal reflux disease)    • Glaucoma    • Hyperlipidemia      Past Surgical History:   Procedure Laterality Date   • ABDOMINOPLASTY      also 2 abdominal hernia repairs at same time   • BREAST BIOPSY Left 2018   • CATARACT EXTRACTION      right- 8/15/23 and left 23   •  SECTION     • COLONOSCOPY     • HYSTERECTOMY     • OOPHORECTOMY     • PA INSERTION/RPLCMT PERIPHERAL/GASTRIC NPGR N/A 2023    Procedure: STAGE 2 AXONIC, INSERTION OF NEUROSTIMULATOR, ELECTRONIC ANALYSIS OF NEUROSTIMULATOR;  Surgeon: Fermín Conley MD;  Location: [facility];  Service: Urology   • REDUCTION MAMMAPLASTY Bilateral    • SACRAL NERVE STIMULATOR PLACEMENT N/A 2023    Procedure: PART 1, INCISION FOR IMPLANTATION OF NEUROSTIMULATOR, ELECTRONIC ANALYSIS OF NEUROSTIMULATOR;  Surgeon: Fermín Conley MD;  Location: [facility];  Service: Urology   • UPPER GASTROINTESTINAL ENDOSCOPY     • US GUIDED BREAST BIOPSY LEFT COMPLETE Left 2016     Social History   Social History     Substance and Sexual Activity   Alcohol Use Yes   • Alcohol/week: 3.0 standard drinks of alcohol   • Types: 3 Glasses of wine per week    Comment: social, 2 x month     Social History     Substance and Sexual Activity   Drug Use Never     Social History     Tobacco Use   Smoking Status Former   • Types: Cigarettes   • Quit date:    • Years since quittin.7   Smokeless Tobacco Never   Tobacco Comments    quit 20 years ago     Family History   Problem Relation Age of Onset   • Diabetes Mother    • Breast cancer Mother 62   • Colon cancer Father 54   • Diabetes Father    • No Known Problems Daughter    • No Known Problems Maternal Grandmother    • No Known Problems Maternal Grandfather    • No Known Problems Paternal Grandmother    • No Known Problems Paternal Grandfather    • Diabetes Brother    • Diabetes Brother    • Diabetes Brother    • Diabetes Brother    • Breast cancer Maternal Aunt 79   • No Known Problems Paternal Aunt    • No Known Problems Paternal Aunt    • No Known Problems Paternal Aunt    • No Known Problems Son        Meds/Allergies       Current Outpatient Medications:   •  albuterol (PROVENTIL HFA,VENTOLIN HFA) 90 mcg/act inhaler  •  Allergy Relief 180 MG tablet  •  aspirin 81 MG tablet  •  atorvastatin (LIPITOR) 20 mg tablet  •  calcium carbonate (TUMS EX) 750 mg chewable tablet  •  cephalexin (KEFLEX) 500 mg capsule  •  ciprofloxacin (CIPRO) 250 mg tablet  •  estradiol (ESTRACE) 1 mg tablet  •  flunisolide (NASALIDE) 25 MCG/ACT (0.025%) SOLN  •  fluticasone (FLONASE) 50 mcg/act nasal spray  •  glimepiride (AMARYL) 2 mg tablet  •  glucose blood test strip  •  ketorolac (ACULAR) 0.5 % ophthalmic solution  •  latanoprost (XALATAN) 0.005 % ophthalmic solution  •  Latanoprost 0.005 % EMUL  •  LORazepam (ATIVAN) 1 mg tablet  •  metFORMIN (GLUCOPHAGE-XR) 500 mg 24 hr tablet  •  mometasone (ELOCON) 0.1 % ointment  •  omeprazole (PriLOSEC) 40 MG capsule  •  polymyxin b-trimethoprim (POLYTRIM) ophthalmic solution  •  prednisoLONE acetate (PRED FORTE) 1 % ophthalmic suspension  •  fexofenadine (ALLEGRA) 180 MG tablet  •  levocetirizine (XYZAL) 5 MG tablet  •  ondansetron (ZOFRAN) 4 mg tablet  • scopolamine (TRANSDERM-SCOP) 1 mg/3 days TD 72 hr patch    Allergies   Allergen Reactions   • Benzedrex [Propylhexedrine] Itching   • Wound Dressing Adhesive Rash           Objective     Blood pressure 122/80, pulse 85, height 5' 5" (1.651 m), weight 112 kg (247 lb). Body mass index is 41.1 kg/m². PHYSICAL EXAM:      General Appearance:   Alert, cooperative, no distress   HEENT:   Normocephalic, atraumatic, anicteric.     Neck:  Supple, symmetrical, trachea midline   Lungs:   Clear to auscultation bilaterally; no rales, rhonchi or wheezing; respirations unlabored    Heart[de-identified]   Regular rate and rhythm; no murmur, rub, or gallop. Abdomen:   Soft, tender in LUQ with palpation, non-distended; normal bowel sounds; no masses, no organomegaly    Genitalia:   Deferred    Rectal:   Deferred    Extremities:  No cyanosis, clubbing or edema    Pulses:  2+ and symmetric    Skin:  No jaundice, rashes, or lesions    Lymph nodes:  No palpable cervical lymphadenopathy        Lab Results:   No visits with results within 1 Day(s) from this visit. Latest known visit with results is:   Office Visit on 09/21/2023   Component Date Value   • LEUKOCYTE ESTERASE,UA 09/21/2023 small    • NITRITE,UA 09/21/2023 negative    • SL AMB POCT UROBILINOGEN 09/21/2023 normal    • POCT URINE PROTEIN 09/21/2023 negative    •  PH,UA 09/21/2023 5.0    • BLOOD,UA 09/21/2023 trace    • SPECIFIC GRAVITY,UA 09/21/2023 1,000    • KETONES,UA 09/21/2023 negative    • BILIRUBIN,UA 09/21/2023 negative    • GLUCOSE, UA 09/21/2023 2,000    •  COLOR,UA 09/21/2023 yellow    • CLARITY,UA 09/21/2023 clear          Radiology Results:   No results found.

## 2023-09-25 NOTE — TELEPHONE ENCOUNTER
Scheduled date of EGD(as of today): 10/02/2023  Physician performing EGD: Dr Marga Funes   Location of EGD: [facility]   Instructions reviewed with patient by: ana    Clearances: N/a   Pt is diabetic instructions is given at office

## 2023-09-25 NOTE — PROGRESS NOTES
[facility] - Outpatient Follow-up Note  Laura Sarmiento 79 y.o. female MRN: [id number]  Encounter: [id number]          ASSESSMENT AND PLAN:      1. Hx of adenomatous colonic polyps  2. Colon cancer screening  Patient has history of colon polyps. Colonoscopy done 09/06/2022 which showed 2 colon polyps removed. Polyps were positive for tubular adenomas. Colon cancer screening up-to-date. 3. Diarrhea  Patient reports diarrhea has resolved. Bowel patterns are regular. Denies blood in stool, blood from rectal area, or black tarry stool. 4. Gastroesophageal reflux disease without esophagitis  5. James's esophagus without dysplasia  Patient has history of James's esophagus and GERD. Patient continues to have breakthrough symptoms of acid reflux. Patient reports she is currently following antireflux diet and measures but symptoms still remain uncontrolled. Patient's acid reflux currently is not controlled with Pepcid at bedtime and omeprazole daily in morning. She does have nocturnal symptoms approximately 1 time a week. -Discontinue famotidine at bedtime  - Increase Omeprazole (PriLOSEC) 40 MG capsule; Take 1 capsule (40 mg total) by mouth 2 (two) times a day Take half hour prior to breakfast and half hour prior to dinner  Dispense: 180 capsule; Refill: 2  - Continue antireflux diet and measures    6. Dysphagia, unspecified type  Patient reports dysphagia to pills. Patient reports she has to break her pills in order to get him down. Patient states that she feels like the pills get stuck in her chest. Patient denies dysphagia to solids or liquids. - EGD; scheduled for EGD. Prep and procedure explained to patient in detail. Further recommendations pending results of EGD. - omeprazole (PriLOSEC) 40 MG capsule; Take 1 capsule (40 mg total) by mouth 2 (two) times a day Take half hour prior to breakfast and half hour prior to dinner  Dispense: 180 capsule;  Refill: 2  ______________________________________________________________________    SUBJECTIVE:  Ra Olp is a 61-year-old female who presents to office for follow-up. Patient reports that diarrhea she previously experienced has since resolved. Bowel patterns are normal.Denies blood in stool, blood from rectal area, or black tarry stool. Stool studies were negative for underlying infection. Patient reports dysphagia to pills. Patient reports she has to break her pills in order to get him down. Patient states that she feels like the pills get stuck in her chest. Patient denies dysphagia to solids or liquids. Patient has history of James's esophagus and GERD. Patient continues to have breakthrough symptoms of acid reflux. Patient reports she is currently following antireflux diet and measures but symptoms still remain uncontrolled. Patient's acid reflux currently is not controlled with Pepcid at bedtime and omeprazole daily in morning. She does have nocturnal symptoms approximately 1 time a week. Patient denies nausea, vomiting, epigastric or lower abdominal pain. Patient is tender to palpation in left upper quadrant. Positive family history colon cancer father diagnosed age 54. Patient is a former smoker she quit in [address] [facility]. Results of recent EGD and colonoscopy and biopsy results reviewed with patient. Colonoscopy done 09/06/2022 which showed 2 colon polyps removed. Polyps were positive for tubular adenomas, no high-grade dysplasia or malignancy. EGD done 09/06/2022 showed some equivocal lymphocytes in the duodenum with tTG of 4 in the past which is equivocal, repeat biopsies pending, mild antritis, Barretts esophagus, evaluate for EOE. Biopsy of duodenum benign small intestinal mucosa and no evidence of increased intraepithelial lymphocytes. Stomach biopsy no H pylori, no ulceration, no dysplasia or malignancy. Mild chronic inflammation consistent with reactive/chemical gastritis.   EG junction biopsy negative goblet cell metaplasia, no dysplasia or malignancy. Moderate chronic inflammation with reactive epithelial changes. Gastroesophageal junction mucosa with cardiac type glands present in-between squamous mucosa (esophageal columnar metaplasia). Esophagus biopsy benign squamous mucosa with no pathological changes. No evidence of eosinophilic esophagitis. No intestinal metaplasia, dysplasia, or malignancy. REVIEW OF SYSTEMS IS OTHERWISE NEGATIVE.       Historical Information   Past Medical History:   Diagnosis Date   • Anxiety    • Asthma     asthma related, used inhaler 1 week ago   • James's esophagus    • Colon polyp    • Diabetes mellitus (HCC)     NIDDM   • Dysphagia     1 month ago started   • GERD (gastroesophageal reflux disease)    • Glaucoma    • Hyperlipidemia      Past Surgical History:   Procedure Laterality Date   • ABDOMINOPLASTY      also 2 abdominal hernia repairs at same time   • BREAST BIOPSY Left 2018   • CATARACT EXTRACTION      right- 8/15/23 and left 23   •  SECTION     • COLONOSCOPY     • HYSTERECTOMY     • OOPHORECTOMY     • NJ INSERTION/RPLCMT PERIPHERAL/GASTRIC NPGR N/A 2023    Procedure: STAGE 2 AXONIC, INSERTION OF NEUROSTIMULATOR, ELECTRONIC ANALYSIS OF NEUROSTIMULATOR;  Surgeon: Kishore Reid MD;  Location: [facility];  Service: Urology   • REDUCTION MAMMAPLASTY Bilateral    • SACRAL NERVE STIMULATOR PLACEMENT N/A 2023    Procedure: PART 1, INCISION FOR IMPLANTATION OF NEUROSTIMULATOR, ELECTRONIC ANALYSIS OF NEUROSTIMULATOR;  Surgeon: Kishore Reid MD;  Location: [facility];  Service: Urology   • UPPER GASTROINTESTINAL ENDOSCOPY     • US GUIDED BREAST BIOPSY LEFT COMPLETE Left 2016     Social History   Social History     Substance and Sexual Activity   Alcohol Use Yes   • Alcohol/week: 3.0 standard drinks of alcohol   • Types: 3 Glasses of wine per week    Comment: social, 2 x month     Social History     Substance and Sexual Activity   Drug Use Never     Social History     Tobacco Use   Smoking Status Former   • Types: Cigarettes   • Quit date:    • Years since quittin.7   Smokeless Tobacco Never   Tobacco Comments    quit 20 years ago     Family History   Problem Relation Age of Onset   • Diabetes Mother    • Breast cancer Mother 62   • Colon cancer Father 54   • Diabetes Father    • No Known Problems Daughter    • No Known Problems Maternal Grandmother    • No Known Problems Maternal Grandfather    • No Known Problems Paternal Grandmother    • No Known Problems Paternal Grandfather    • Diabetes Brother    • Diabetes Brother    • Diabetes Brother    • Diabetes Brother    • Breast cancer Maternal Aunt 79   • No Known Problems Paternal Aunt    • No Known Problems Paternal Aunt    • No Known Problems Paternal Aunt    • No Known Problems Son        Meds/Allergies       Current Outpatient Medications:   •  albuterol (PROVENTIL HFA,VENTOLIN HFA) 90 mcg/act inhaler  •  Allergy Relief 180 MG tablet  •  aspirin 81 MG tablet  •  atorvastatin (LIPITOR) 20 mg tablet  •  calcium carbonate (TUMS EX) 750 mg chewable tablet  •  cephalexin (KEFLEX) 500 mg capsule  •  ciprofloxacin (CIPRO) 250 mg tablet  •  estradiol (ESTRACE) 1 mg tablet  •  flunisolide (NASALIDE) 25 MCG/ACT (0.025%) SOLN  •  fluticasone (FLONASE) 50 mcg/act nasal spray  •  glimepiride (AMARYL) 2 mg tablet  •  glucose blood test strip  •  ketorolac (ACULAR) 0.5 % ophthalmic solution  •  latanoprost (XALATAN) 0.005 % ophthalmic solution  •  Latanoprost 0.005 % EMUL  •  LORazepam (ATIVAN) 1 mg tablet  •  metFORMIN (GLUCOPHAGE-XR) 500 mg 24 hr tablet  •  mometasone (ELOCON) 0.1 % ointment  •  omeprazole (PriLOSEC) 40 MG capsule  •  polymyxin b-trimethoprim (POLYTRIM) ophthalmic solution  •  prednisoLONE acetate (PRED FORTE) 1 % ophthalmic suspension  •  fexofenadine (ALLEGRA) 180 MG tablet  •  levocetirizine (XYZAL) 5 MG tablet  •  ondansetron (ZOFRAN) 4 mg tablet  • scopolamine (TRANSDERM-SCOP) 1 mg/3 days TD 72 hr patch    Allergies   Allergen Reactions   • Benzedrex [Propylhexedrine] Itching   • Wound Dressing Adhesive Rash           Objective     Blood pressure 122/80, pulse 85, height 5' 5" (1.651 m), weight 112 kg (247 lb). Body mass index is 41.1 kg/m². PHYSICAL EXAM:      General Appearance:   Alert, cooperative, no distress   HEENT:   Normocephalic, atraumatic, anicteric.     Neck:  Supple, symmetrical, trachea midline   Lungs:   Clear to auscultation bilaterally; no rales, rhonchi or wheezing; respirations unlabored    Heart[de-identified]   Regular rate and rhythm; no murmur, rub, or gallop. Abdomen:   Soft, tender in LUQ with palpation, non-distended; normal bowel sounds; no masses, no organomegaly    Genitalia:   Deferred    Rectal:   Deferred    Extremities:  No cyanosis, clubbing or edema    Pulses:  2+ and symmetric    Skin:  No jaundice, rashes, or lesions    Lymph nodes:  No palpable cervical lymphadenopathy        Lab Results:   No visits with results within 1 Day(s) from this visit. Latest known visit with results is:   Office Visit on 09/21/2023   Component Date Value   • LEUKOCYTE ESTERASE,UA 09/21/2023 small    • NITRITE,UA 09/21/2023 negative    • SL AMB POCT UROBILINOGEN 09/21/2023 normal    • POCT URINE PROTEIN 09/21/2023 negative    •  PH,UA 09/21/2023 5.0    • BLOOD,UA 09/21/2023 trace    • SPECIFIC GRAVITY,UA 09/21/2023 1,000    • KETONES,UA 09/21/2023 negative    • BILIRUBIN,UA 09/21/2023 negative    • GLUCOSE, UA 09/21/2023 2,000    •  COLOR,UA 09/21/2023 yellow    • CLARITY,UA 09/21/2023 clear          Radiology Results:   No results found.

## 2023-09-28 ENCOUNTER — TELEPHONE (OUTPATIENT)
Dept: GYNECOLOGY | Facility: CLINIC | Age: 68
End: 2023-09-28

## 2023-09-29 ENCOUNTER — ANESTHESIA (OUTPATIENT)
Dept: ANESTHESIOLOGY | Facility: AMBULATORY SURGERY CENTER | Age: 68
End: 2023-09-29

## 2023-09-29 ENCOUNTER — ANESTHESIA EVENT (OUTPATIENT)
Dept: ANESTHESIOLOGY | Facility: AMBULATORY SURGERY CENTER | Age: 68
End: 2023-09-29

## 2023-10-02 ENCOUNTER — HOSPITAL ENCOUNTER (OUTPATIENT)
Dept: GASTROENTEROLOGY | Facility: AMBULATORY SURGERY CENTER | Age: 68
Discharge: HOME | End: 2023-10-02
Payer: MEDICARE

## 2023-10-02 ENCOUNTER — ANESTHESIA EVENT (OUTPATIENT)
Dept: GASTROENTEROLOGY | Facility: AMBULATORY SURGERY CENTER | Age: 68
End: 2023-10-02

## 2023-10-02 ENCOUNTER — ANESTHESIA (OUTPATIENT)
Dept: GASTROENTEROLOGY | Facility: AMBULATORY SURGERY CENTER | Age: 68
End: 2023-10-02

## 2023-10-02 VITALS
HEIGHT: 65 IN | WEIGHT: 230 LBS | DIASTOLIC BLOOD PRESSURE: 78 MMHG | SYSTOLIC BLOOD PRESSURE: 132 MMHG | OXYGEN SATURATION: 98 % | TEMPERATURE: 96.9 F | RESPIRATION RATE: 18 BRPM | BODY MASS INDEX: 38.32 KG/M2 | HEART RATE: 78 BPM

## 2023-10-02 DIAGNOSIS — R13.10 DYSPHAGIA, UNSPECIFIED TYPE: ICD-10-CM

## 2023-10-02 PROCEDURE — 88305 TISSUE EXAM BY PATHOLOGIST: CPT | Performed by: PATHOLOGY

## 2023-10-02 PROCEDURE — 43239 EGD BIOPSY SINGLE/MULTIPLE: CPT | Performed by: INTERNAL MEDICINE

## 2023-10-02 PROCEDURE — 88342 IMHCHEM/IMCYTCHM 1ST ANTB: CPT | Performed by: PATHOLOGY

## 2023-10-02 RX ORDER — PROPOFOL 10 MG/ML
INJECTION, EMULSION INTRAVENOUS AS NEEDED
Status: DISCONTINUED | OUTPATIENT
Start: 2023-10-02 | End: 2023-10-02

## 2023-10-02 RX ORDER — LIDOCAINE HYDROCHLORIDE 20 MG/ML
INJECTION, SOLUTION EPIDURAL; INFILTRATION; INTRACAUDAL; PERINEURAL AS NEEDED
Status: DISCONTINUED | OUTPATIENT
Start: 2023-10-02 | End: 2023-10-02

## 2023-10-02 RX ORDER — SODIUM CHLORIDE, SODIUM LACTATE, POTASSIUM CHLORIDE, CALCIUM CHLORIDE 600; 310; 30; 20 MG/100ML; MG/100ML; MG/100ML; MG/100ML
INJECTION, SOLUTION INTRAVENOUS CONTINUOUS PRN
Status: DISCONTINUED | OUTPATIENT
Start: 2023-10-02 | End: 2023-10-02

## 2023-10-02 RX ADMIN — PROPOFOL 30 MG: 10 INJECTION, EMULSION INTRAVENOUS at 08:56

## 2023-10-02 RX ADMIN — LIDOCAINE HYDROCHLORIDE 100 MG: 20 INJECTION, SOLUTION EPIDURAL; INFILTRATION; INTRACAUDAL; PERINEURAL at 08:52

## 2023-10-02 RX ADMIN — PROPOFOL 30 MG: 10 INJECTION, EMULSION INTRAVENOUS at 08:55

## 2023-10-02 RX ADMIN — PROPOFOL 50 MG: 10 INJECTION, EMULSION INTRAVENOUS at 08:54

## 2023-10-02 RX ADMIN — PROPOFOL 30 MG: 10 INJECTION, EMULSION INTRAVENOUS at 08:53

## 2023-10-02 RX ADMIN — SODIUM CHLORIDE, SODIUM LACTATE, POTASSIUM CHLORIDE, CALCIUM CHLORIDE: 600; 310; 30; 20 INJECTION, SOLUTION INTRAVENOUS at 08:17

## 2023-10-02 RX ADMIN — PROPOFOL 130 MG: 10 INJECTION, EMULSION INTRAVENOUS at 08:52

## 2023-10-02 NOTE — INTERVAL H&P NOTE
H&P reviewed. After examining the patient I find no changes in the patients condition since the H&P had been written.     Vitals:    10/02/23 0816   BP: (!) 186/80   Pulse: 83   Resp: 18   Temp: (!) 96.9 °F (36.1 °C)   SpO2: 99%

## 2023-10-02 NOTE — ANESTHESIA PREPROCEDURE EVALUATION
Procedure:  EGD    Relevant Problems   ENDO   (+) Diabetes 1.5, managed as type 2 (HCC)   (+) Dyslipidemia associated with type 2 diabetes mellitus       GI/HEPATIC   (+) Gastroesophageal reflux disease without esophagitis      HEMATOLOGY   (+) Iron deficiency anemia      NEURO/PSYCH   (+) Generalized anxiety disorder      PULMONARY   (+) Mild intermittent asthma      Digestive   (+) Irritable bowel syndrome      Other   (+) H/O psoriasis      Dysphagia  Glaucoma    Patient feels eye drops are leading to post-nasal drip  Physical Exam    Airway    Mallampati score: II  TM Distance: >3 FB  Neck ROM: full     Dental   Comment: None loose, No notable dental hx     Cardiovascular      Pulmonary      Other Findings        Anesthesia Plan  ASA Score- 2     Anesthesia Type- IV sedation with anesthesia with ASA Monitors. Additional Monitors:   Airway Plan:     Comment: Npo after Mn    Patient educated on the possibility for awareness under sedation and of the possibility of airway intervention in the event of an airway or procedural emergency  . Plan Factors-Exercise tolerance (METS): >4 METS. Chart reviewed. Patient summary reviewed. Patient is not a current smoker. Induction- intravenous. Postoperative Plan-     Informed Consent- Anesthetic plan and risks discussed with patient. I personally reviewed this patient with the CRNA. Discussed and agreed on the Anesthesia Plan with the CRNA. Peng Loyd

## 2023-10-02 NOTE — ANESTHESIA POSTPROCEDURE EVALUATION
Post-Op Assessment Note    CV Status:  Stable  Pain Score: 0    Pain management: adequate     Mental Status:  Sleepy and arousable   Hydration Status:  Euvolemic   PONV Controlled:  Controlled   Airway Patency:  Patent      Post Op Vitals Reviewed: Yes      Staff: Anesthesiologist, CRNA         No notable events documented.     /64 (10/02/23 0906)    Temp     Pulse 79 (10/02/23 0906)   Resp 18 (10/02/23 0906)    SpO2 98 % (10/02/23 0906)

## 2023-10-09 PROCEDURE — 88342 IMHCHEM/IMCYTCHM 1ST ANTB: CPT | Performed by: PATHOLOGY

## 2023-10-09 PROCEDURE — 88305 TISSUE EXAM BY PATHOLOGIST: CPT | Performed by: PATHOLOGY

## 2023-11-22 ENCOUNTER — OFFICE VISIT (OUTPATIENT)
Dept: GYNECOLOGY | Facility: CLINIC | Age: 68
End: 2023-11-22
Payer: MEDICARE

## 2023-11-22 VITALS
BODY MASS INDEX: 39.99 KG/M2 | WEIGHT: 240 LBS | SYSTOLIC BLOOD PRESSURE: 138 MMHG | DIASTOLIC BLOOD PRESSURE: 80 MMHG | HEIGHT: 65 IN

## 2023-11-22 DIAGNOSIS — L73.9 FOLLICULITIS: Primary | ICD-10-CM

## 2023-11-22 PROCEDURE — 99212 OFFICE O/P EST SF 10 MIN: CPT | Performed by: OBSTETRICS & GYNECOLOGY

## 2023-11-22 RX ORDER — CEPHALEXIN 500 MG/1
500 CAPSULE ORAL EVERY 12 HOURS SCHEDULED
Qty: 14 CAPSULE | Refills: 0 | Status: SHIPPED | OUTPATIENT
Start: 2023-11-22 | End: 2023-11-29

## 2023-11-22 NOTE — PROGRESS NOTES
Assessment/Plan:    Folliculitis of the left mons pubis  Hysterectomy BSO   Type 2 diabetes  Colonoscopy with polyps 2022. Interstitial cystitis  DIANE    Plan: Rx Keflex 500 mg twice daily x 7 days. Warm compresses daily. Return to office next week for follow-up    Subjective:   and a      Patient ID: Jade Owens is a 79 y.o. female with a painful red area above her pubic bone on the left side since last week. Has not been checking blood sugars. Denies any drainage fevers or chills. No vaginal discharge. Objective: There were no vitals taken for this visit. Physical Exam  Genitourinary:         Comments: 4 to 5 cm red indurated area above the left mons pubis. Slight tenderness to touch. Not fluctuant.  used

## 2023-12-07 ENCOUNTER — OFFICE VISIT (OUTPATIENT)
Dept: GYNECOLOGY | Facility: CLINIC | Age: 68
End: 2023-12-07
Payer: MEDICARE

## 2023-12-07 VITALS
BODY MASS INDEX: 40.15 KG/M2 | DIASTOLIC BLOOD PRESSURE: 90 MMHG | WEIGHT: 241 LBS | HEIGHT: 65 IN | SYSTOLIC BLOOD PRESSURE: 132 MMHG

## 2023-12-07 DIAGNOSIS — L73.8 BACTERIAL FOLLICULITIS: Primary | ICD-10-CM

## 2023-12-07 PROCEDURE — 99212 OFFICE O/P EST SF 10 MIN: CPT | Performed by: OBSTETRICS & GYNECOLOGY

## 2023-12-07 NOTE — PROGRESS NOTES
Patient returns to the office following 10-day treatment for folliculitis of the left mons pubis. He said it drained intermediate through the course and feels much better. Physical exam: Slight induration measuring 1 x 3 cm. No drainage. Impression: Almost complete resolution of folliculitis of the left mons pubis. Plan: Continue warm compresses. The area was circled with ink pen. Patient was instructed that the area got larger than the brigido, call the office for further antibiotic treatment. Patient is scheduled for left knee surgery next week.

## 2023-12-11 ENCOUNTER — OFFICE VISIT (OUTPATIENT)
Dept: GASTROENTEROLOGY | Facility: CLINIC | Age: 68
End: 2023-12-11
Payer: MEDICARE

## 2023-12-11 VITALS
HEART RATE: 75 BPM | HEIGHT: 65 IN | WEIGHT: 240 LBS | BODY MASS INDEX: 39.99 KG/M2 | SYSTOLIC BLOOD PRESSURE: 151 MMHG | DIASTOLIC BLOOD PRESSURE: 87 MMHG

## 2023-12-11 DIAGNOSIS — Z12.11 COLON CANCER SCREENING: ICD-10-CM

## 2023-12-11 DIAGNOSIS — K22.70 BARRETT'S ESOPHAGUS WITHOUT DYSPLASIA: Primary | ICD-10-CM

## 2023-12-11 DIAGNOSIS — E66.01 OBESITY, MORBID, BMI 40.0-49.9 (HCC): ICD-10-CM

## 2023-12-11 DIAGNOSIS — R13.10 DYSPHAGIA, UNSPECIFIED TYPE: ICD-10-CM

## 2023-12-11 DIAGNOSIS — K21.9 GASTROESOPHAGEAL REFLUX DISEASE WITHOUT ESOPHAGITIS: ICD-10-CM

## 2023-12-11 DIAGNOSIS — Z86.010 HX OF ADENOMATOUS COLONIC POLYPS: ICD-10-CM

## 2023-12-11 PROCEDURE — 99214 OFFICE O/P EST MOD 30 MIN: CPT | Performed by: NURSE PRACTITIONER

## 2023-12-11 RX ORDER — FAMOTIDINE 40 MG/1
40 TABLET, FILM COATED ORAL
Qty: 90 TABLET | Refills: 0 | Status: SHIPPED | OUTPATIENT
Start: 2023-12-11 | End: 2024-03-10

## 2023-12-11 NOTE — PROGRESS NOTES
[facility] - Outpatient Follow-up Note  Alona Najera 79 y.o. female MRN: [id number]  Encounter: [id number]          ASSESSMENT AND PLAN:      1. James's esophagus without dysplasia  2. Gastroesophageal reflux disease without esophagitis  Patient has history of James's esophagus and GERD. Patient continues to have breakthrough symptoms of acid reflux. Patient reports she is currently following antireflux diet and measures but symptoms still remain uncontrolled. Patient's acid reflux currently is not controlled with Pepcid at bedtime and omeprazole daily in morning. She does have nocturnal symptoms approximately 1 time a week. -Continue Omeprazole 40 mg daily in A.M.  -Start Pepcid 40 mg daily at bedtime  -Continue antireflux diet and measures  -Repeat EGD in 3-5 years due to history of intestinal metaplasia    3. Dysphagia, unspecified type  EGD done 9/25/2023 Abnormal mucosa in the antrum; performed cold forceps biopsy  Irregular Z-line 38 cm from the incisors; performed cold forceps biopsy. The remainder of a detailed exam otherwise unremarkable. Nonobstructive dysphagia. Patient reports dysphagia only with large pill, otherwise no dysphagia to foods or liquids. -Advised to take pill in applesauce or pudding    4. Hx of adenomatous colonic polyp  5. Colon cancer screening  6 Family history colon cancer  Patient has history of colon polyps. Positive family hx colon cancer , father diagnosed age 54. Colonoscopy done 09/06/2022 which showed 2 colon polyps removed. Polyps were positive for tubular adenomas. Colon cancer screening up-to-date.     -Colonoscopy due in 5 years due to family history colon cancer  father  and history of polyps,  Due 9/2027. Follow up in 4 months  ______________________________________________________________________    SUBJECTIVE:   Danni Mayfield is a 79-year-old female who presents to office for follow-up.   Bowel patterns are normal.Denies blood in stool, blood from rectal area, or black tarry stool. Stool studies were negative for underlying infection. Patient reports dysphagia only to large pill she takes. Patient reports she has to break her pills in order to get him down. Patient denies dysphagia to solids or liquids. Patient reports occasional breakthrough symptoms at night of acid reflux and heartburn. Denies nausea or vomiting. She is currently taking Omeprazole daily. Results of EGD and biopsy reviewed with patient. Abdomen exam benign, no tenderness or guarding. Positive family history colon cancer father diagnosed age 54. Patient is a former smoker she quit in  [facility]. Results of recent EGD and colonoscopy and biopsy results reviewed with patient. Colonoscopy done 2022 which showed 2 colon polyps removed. Polyps were positive for tubular adenomas, no high-grade dysplasia or malignancy. EGD done 2023 Abnormal mucosa in the antrum; performed cold forceps biopsy. Irregular Z-line 38 cm from the incisors; performed cold forceps biopsy. The remainder of a detailed exam otherwise unremarkable. Nonobstructive dysphagia. Biopsy showed gastritis and chronic inflammation in EG junction. No intestinal metaplasia or H. Pylori. REVIEW OF SYSTEMS IS OTHERWISE NEGATIVE.       Historical Information   Past Medical History:   Diagnosis Date    Anxiety     Asthma     asthma related, used inhaler 1 week ago    James's esophagus     Colon polyp     Diabetes mellitus ([address])     NIDDM    Dysphagia     1 month ago started    GERD (gastroesophageal reflux disease)     Glaucoma     Hyperlipidemia      Past Surgical History:   Procedure Laterality Date    ABDOMINOPLASTY      also 2 abdominal hernia repairs at same time    BREAST BIOPSY Left     CATARACT EXTRACTION      right- 8/15/23 and left 23     SECTION      COLONOSCOPY      HYSTERECTOMY      OOPHORECTOMY      IL INSERTION/RPLCMT PERIPHERAL/GASTRIC NPGR N/A 2023 Procedure: STAGE 2 AXONIC, INSERTION OF NEUROSTIMULATOR, ELECTRONIC ANALYSIS OF NEUROSTIMULATOR;  Surgeon: Trisha Suh MD;  Location: [facility];  Service: Urology    REDUCTION MAMMAPLASTY Bilateral 2001    SACRAL NERVE STIMULATOR PLACEMENT N/A 2023    Procedure: PART 1, INCISION FOR IMPLANTATION OF NEUROSTIMULATOR, ELECTRONIC ANALYSIS OF NEUROSTIMULATOR;  Surgeon: Trisha Suh MD;  Location: [facility];  Service: Urology    UPPER GASTROINTESTINAL ENDOSCOPY      US GUIDED BREAST BIOPSY LEFT COMPLETE Left 2016     Social History   Social History     Substance and Sexual Activity   Alcohol Use Yes    Alcohol/week: 3.0 standard drinks of alcohol    Types: 3 Glasses of wine per week    Comment: social, 2 x month     Social History     Substance and Sexual Activity   Drug Use Never     Social History     Tobacco Use   Smoking Status Former    Types: Cigarettes    Quit date:     Years since quittin.9   Smokeless Tobacco Never   Tobacco Comments    quit 20 years ago     Family History   Problem Relation Age of Onset    Diabetes Mother     Breast cancer Mother 62    Colon cancer Father 54    Diabetes Father     No Known Problems Daughter     No Known Problems Maternal Grandmother     No Known Problems Maternal Grandfather     No Known Problems Paternal Grandmother     No Known Problems Paternal Grandfather     Diabetes Brother     Diabetes Brother     Diabetes Brother     Diabetes Brother     Breast cancer Maternal Aunt 79    No Known Problems Paternal Aunt     No Known Problems Paternal Aunt     No Known Problems Paternal Aunt     No Known Problems Son        Meds/Allergies       Current Outpatient Medications:     Acetaminophen (TYLENOL PO)    albuterol (PROVENTIL HFA,VENTOLIN HFA) 90 mcg/act inhaler    aspirin 81 MG tablet    atorvastatin (LIPITOR) 20 mg tablet    Calcium Magnesium Zinc 333-133-5 MG TABS    estradiol (ESTRACE) 1 mg tablet    flunisolide (NASALIDE) 25 MCG/ACT (0.025%) SOLN glimepiride (AMARYL) 2 mg tablet    glucose blood test strip    ketorolac (ACULAR) 0.5 % ophthalmic solution    latanoprost (XALATAN) 0.005 % ophthalmic solution    LORazepam (ATIVAN) 1 mg tablet    metFORMIN (GLUCOPHAGE-XR) 500 mg 24 hr tablet    mometasone (ELOCON) 0.1 % ointment    omeprazole (PriLOSEC) 40 MG capsule    prednisoLONE acetate (PRED FORTE) 1 % ophthalmic suspension    scopolamine (TRANSDERM-SCOP) 1 mg/3 days TD 72 hr patch    Allergies   Allergen Reactions    Benzedrex [Propylhexedrine] Itching    Wound Dressing Adhesive Rash           Objective     There were no vitals taken for this visit. There is no height or weight on file to calculate BMI. PHYSICAL EXAM:      General Appearance:   Alert, cooperative, no distress   HEENT:   Normocephalic, atraumatic, anicteric. Neck:  Supple, symmetrical, trachea midline   Lungs:   Clear to auscultation bilaterally; no rales, rhonchi or wheezing; respirations unlabored    Heart[de-identified]   Regular rate and rhythm; no murmur, rub, or gallop. Abdomen:   Soft, non-tender, non-distended; normal bowel sounds; no masses, no organomegaly    Genitalia:   Deferred    Rectal:   Deferred    Extremities:  No cyanosis, clubbing or edema    Pulses:  2+ and symmetric    Skin:  No jaundice, rashes, or lesions    Lymph nodes:  No palpable cervical lymphadenopathy        Lab Results:   No visits with results within 1 Day(s) from this visit.    Latest known visit with results is:   Hospital Outpatient Visit on 10/02/2023   Component Date Value    Case Report 10/02/2023                      Value:Surgical Pathology Report                         Case: [id number]                                   Authorizing Provider:  Praful Henriquez MD    Collected:           10/02/2023 0859              Ordering Location:     [address] [place] [address] Received:            10/02/2023 2134                                     [facility] Pathologist:           Moe Lazo MD                                                           Specimens:   A) - Stomach, cold bx antrum ck for hpy                                                             B) - Esophagus, cold bx lower esophagus reflux                                             Final Diagnosis 10/02/2023                      Value: This result contains rich text formatting which cannot be displayed here. Additional Information 10/02/2023                      Value: This result contains rich text formatting which cannot be displayed here. Gross Description 10/02/2023                      Value: This result contains rich text formatting which cannot be displayed here. Clinical Information 10/02/2023                      Value:FINDINGS:  · Abnormal mucosa in the antrum; performed cold forceps biopsy  · Irregular Z-line 38 cm from the incisors; performed cold forceps biopsy  · The remainder of a detailed exam otherwise unremarkable         Radiology Results:   MRI knee left wo contrast    Result Date: 11/27/2023  Narrative: History: Meniscal injury, knee Left knee pain, evaluate for meniscus tear Left knee pain, evaluate for meniscus tear Additional history obtained from electronic medical record/by technologist: None. Technique: Multiplanar, multisequence noncontrast MRI of the left knee. Comparison: Left knee x-rays dated 11/15/2023. Findings: LIGAMENTS Cruciate ligaments: ACL and PCL degeneration without full-thickness tear. Medial collateral ligament: Superficial and deep components intact. No periligamentous edema. Lateral collateral ligament: Intact. Posterolateral corner structures: Intact. IT band: Intact. MENISCI Medial meniscus: Blunting with complex intermediate signal irregularity of the body and posterior horn with 5 mm meniscal cyst formation along the posterior aspect (series 5 image 21). No displaced flap. 3 to 4 mm extrusion into the medial gutter.  Lateral meniscus: No tear. EXTENSOR MECHANISM The distal quadriceps and patellar tendons are intact. The patella is normally positioned within the femoral groove. There is no retinacular disruption. FLUID Small knee joint effusion and small partially ruptured Baker's cyst formation. Medial head of gastrocnemius tendon origin: Intact. OSSEOUS and ARTICULAR STRUCTURES Bones: No fracture, stress reaction, or osseous lesion is seen. Patellofemoral compartment: At least moderate grade diffuse cartilage loss with multifocal areas of deep fissuring and delamination involving the lateral patellar facet with subchondral cystic change. Medial compartment: At least moderate grade diffuse cartilage loss with multifocal areas of deep fissuring with associated mild subchondral edema within far medial aspect of the central weightbearing medial tibial plateau. Lateral compartment: No full-thickness defects. NEUROVASCULAR STRUCTURES Nerves: No intrinsic or extrinsic mass effect on the tibial and common peroneal nerves. Vessels: No aberrant tibial artery. Impression: Impression: 1. Complex tears posterior horn and body medial meniscus with horizontal and radial components superimposed on degeneration. 3 mm para-meniscal cyst formation along the posterior aspect. 3 to 4 mm extrusion into medial gutter. 2.  At least moderate grade diffuse patellofemoral cartilage loss with multifocal areas of deep fissuring and delamination involving lateral patellar facet with subchondral cystic change. 3.  At least moderate diffuse cartilage loss within medial tibiofemoral compartment with multifocal areas of deep fissuring and associated mild subchondral edema within far medial aspect of the weightbearing medial tibial plateau. 4.  Small knee joint effusion and small partially ruptured Baker's cyst formation.   Workstation:[id number]    DXA BONE DENSITY SCAN STANDARD 2 SITES    Result Date: 11/20/2023  Narrative: A DXA bone mineral density examination was performed with a HoloDeolan scanner. The patient is a 79 y.o. white female with a history of Post-menopause . Previous Study Comparison: None     The left hip was examined in several regions. In total, the bone mineral density is 1.3 standard deviations above the predicted peak bone mass and falls within the normal range. The total hip measurement is 1.097 g/cm2. The femoral neck measurement is 1.1 standard deviations above the predicted peak bone mass and is in the normal range. The femoral neck measurement is 0.974 g/cm2. The left forearm was examined. The one-third radius has bone mineral density that is 1.0 standard deviations above the predicted peak bone mass and is within the normal range. The lumbar spine was examined from L1 - L4. In total, the bone mineral density is 1.3 standard deviations below the predicted peak bone mass and is 87% of normal. The lumbar spine measurement is 0.908 g/cm2. Impression: Impression: Osteopenia of the lumbar spine. Currently there is mild increased fracture risk. WHO Fracture Risk Assessment Tool (FRAX) estimates 10 year fracture risk as follows: * Major osteoporotic fracture risk without prior fracture is 5.1 %. * Major osteoporotic fracture risk with prior fracture is 8.5 %. * Hip fracture risk without prior fracture is 0.1 %. * Hip fracture risk with prior fracture is 0.1 %. The National Osteoporosis Foundation recommends that FDA approved medical therapies be considered in postmenopausal women and men age greater than or equal to 48 years with the followin.  Hip or vertebral fracture; 2. T score of less than or equal to  2.5 at the spine or hip; 3. Osteopenia and either a 10 year fracture probability by FRAX of greater than or equal to 20% for major osteoporotic fracture or equal to 3% for hip fracture."   Comment: All treatment decisions, including pharmacologic treatment, require clinical judgment and consideration of individual patient factors, including patient preferences, comorbidities, previous drug use and risk factors not captured in the FRAX model, e.g. fragility, falls, vitamin D deficiency, increased bone turnover, and interval significant decline in bone mineral density. Randell BenavidesRT [place] on 2023  9:24 AM EST A DXA bone mineral density examination was performed with a Hologic scanner. The patient is a 79 y.o. white female with a history of Post-menopause . Previous Study Comparison: None      The left hip was examined in several regions. In total, the bone mineral density is 1.3 standard deviations above the predicted peak bone mass and falls within the normal range. The total hip measurement is 1.097 g/cm2. The femoral neck measurement is 1.1 standard deviations above the predicted peak bone mass and is in the normal range. The femoral neck measurement is 0.974 g/cm2. The left forearm was examined. The one-third radius has bone mineral density that is 1.0 standard deviations above the predicted peak bone mass and is within the normal range. The lumbar spine was examined from L1 - L4. In total, the bone mineral density is 1.3 standard deviations below the predicted peak bone mass and is 87% of normal. The lumbar spine measurement is 0.908 g/cm2. WHO Fracture Risk Assessment Tool (FRAX) estimates 10 year fracture risk as follows: * Major osteoporotic fracture risk without prior fracture is 5.1 %. * Major osteoporotic fracture risk with prior fracture is 8.5 %. * Hip fracture risk without prior fracture is 0.1 %. * Hip fracture risk with prior fracture is 0.1 %. The National Osteoporosis Foundation recommends that FDA approved medical therapies be considered in postmenopausal women and men age greater than or equal to 48 years with the followin.  Hip or vertebral fracture; 2. T score of less than or equal to  2.5 at the spine or hip; 3. Osteopenia and either a 10 year fracture probability by FRAX of greater than or equal to 20% for major osteoporotic fracture or equal to 3% for hip fracture."   Comment: All treatment decisions, including pharmacologic treatment, require clinical judgment and consideration of individual patient factors, including patient preferences, comorbidities, previous drug use and risk factors not captured in the FRAX model, e.g. fragility, falls, vitamin D deficiency, increased bone turnover, and interval significant decline in bone mineral density. Workstation:[id number]    XR PATELLA LEFT 3 VIEWS    Result Date: 11/15/2023  Narrative: This result has an attachment that is not available. Impression: Three-view radiographs left knee show approximately 50% medial compartment joint space narrowing. No evidence for subluxation loose bodies fracture or calcification.   Central patella tracking is noted on merchant's view I have personally provided the amount of critical care time documented below concurrently with the resident/fellow.  This time excludes time spent on separate procedures and time spent teaching. I have reviewed the resident’s / fellow’s documentation and I agree with the history, exam, and assessment and plan of care.

## 2023-12-11 NOTE — PATIENT INSTRUCTIONS
Continue to follow anti reflux diet and measures  Advise to try taking pill in applesauce or pudding

## 2023-12-18 ENCOUNTER — NURSE TRIAGE (OUTPATIENT)
Age: 68
End: 2023-12-18

## 2023-12-18 NOTE — TELEPHONE ENCOUNTER
----- Message from Charis Santiago sent at 12/18/2023 10:31 AM EST -----  Pt calling stating that she started to take Pepcid Babio prescribed but now is having diarrhea. Pt wants to know is this a side effect.

## 2023-12-18 NOTE — TELEPHONE ENCOUNTER
"Pt call transferred to nurses line     Pt last seen 12/11 and was started on Pepcid at bedtime for hx of GERD. Pt feels since starting pepcid she has been experiencing 3-4 loose stools daily. She explained she is fearful to pass gas without being on the toilet d/t risk of fecal incontinence. Pt denies starting any other medications or being on abx recently. She denies alarm symptoms. She is staying hydrated and taking pepto without relief.      Advised pt to stop pepcid and continue pepto. Pt inquired alternative to pepcid at bedtime and if she can take anything to stop the diarrhea. I explained I would discuss with provider an alternative to pepcid at bedtime and I will see if provider is agreeable to imodium prn.     Reason for Disposition   MILD-MODERATE diarrhea (e.g., 1-6 times / day more than normal)    Answer Assessment - Initial Assessment Questions  1. DIARRHEA SEVERITY: \"How bad is the diarrhea?\" \"How many extra stools have you had in the past 24 hours than normal?\"     - NO DIARRHEA (SCALE 0)    - MILD (SCALE 1-3): Few loose or mushy BMs; increase of 1-3 stools over normal daily number of stools; mild increase in ostomy output.    -  MODERATE (SCALE 4-7): Increase of 4-6 stools daily over normal; moderate increase in ostomy output.  * SEVERE (SCALE 8-10; OR 'WORST POSSIBLE'): Increase of 7 or more stools daily over normal; moderate increase in ostomy output; incontinence.      Mild to moderate 3-4 bms daily   2. ONSET: \"When did the diarrhea begin?\"       Since last week   3. BM CONSISTENCY: \"How loose or watery is the diarrhea?\"       Loose     8. HYDRATION: \"Any signs of dehydration?\" (e.g., dry mouth [not just dry lips], too weak to stand, dizziness, new weight loss) \"When did you last urinate?\"      Denies     10. ANTIBIOTIC USE: \"Are you taking antibiotics now or have you taken antibiotics in the past 2 months?\"        Denies    Protocols used: Diarrhea-ADULT-[place]    "

## 2023-12-18 NOTE — TELEPHONE ENCOUNTER
Please call and inform patient to stop Pepcid if it is causing diarrhea,.  May use Pepto-Bismol as needed.  May use Imodium as needed.  May take Gaviscon or Tums as needed for breakthrough symptoms.  Patient is already on omeprazole 40 mg 2 times a day.  Thank you

## 2023-12-18 NOTE — TELEPHONE ENCOUNTER
Patient called to speak with one our nurses regarding her symptoms. Transferred called to SANCHEZ Leo thank you

## 2023-12-18 NOTE — TELEPHONE ENCOUNTER
Relayed information. Pt verbalized understanding. At last OV pt was instructed ppi daily. She is only taking it daily. I asked pt to continue ppi daily and use gaviscon or tums prn. If symptoms of GERD continue, can consider increasing ppi to bid. Pt also aware to call back if diarrhea is unresolved after stopped pepcid and use of imodium.

## 2023-12-18 NOTE — TELEPHONE ENCOUNTER
Reason for Disposition  • Caller has already spoken with another triager and has no further questions    Protocols used: No Contact or Duplicate Contact Call-ADULT-[place]

## 2023-12-18 NOTE — TELEPHONE ENCOUNTER
Please inform patient was previously on lower dose of Pepcid with no problems.  If she continues to have diarrhea she may need further workup.  She may have an underlying gastroenteritis. If symptoms do not improve may need imaging or stool studies to rule out infection. Thank you

## 2024-01-16 ENCOUNTER — TELEPHONE (OUTPATIENT)
Dept: GASTROENTEROLOGY | Facility: CLINIC | Age: 69
End: 2024-01-16

## 2024-01-16 DIAGNOSIS — K22.70 BARRETT'S ESOPHAGUS WITHOUT DYSPLASIA: Primary | ICD-10-CM

## 2024-01-16 DIAGNOSIS — K21.9 GASTROESOPHAGEAL REFLUX DISEASE WITHOUT ESOPHAGITIS: ICD-10-CM

## 2024-01-16 RX ORDER — OMEPRAZOLE 40 MG/1
40 CAPSULE, DELAYED RELEASE ORAL 2 TIMES DAILY
Qty: 180 CAPSULE | Refills: 2 | Status: SHIPPED | OUTPATIENT
Start: 2024-01-16 | End: 2024-04-15

## 2024-01-16 NOTE — TELEPHONE ENCOUNTER
Received fax from [facility] for a 90 day supply of omeprazole 40 mg. Please send to [facility] mail order pharmacy. Thank you

## 2024-01-29 ENCOUNTER — OFFICE VISIT (OUTPATIENT)
Dept: GYNECOLOGY | Facility: CLINIC | Age: 69
End: 2024-01-29
Payer: MEDICARE

## 2024-01-29 VITALS
SYSTOLIC BLOOD PRESSURE: 126 MMHG | BODY MASS INDEX: 40.32 KG/M2 | HEIGHT: 65 IN | WEIGHT: 242 LBS | DIASTOLIC BLOOD PRESSURE: 84 MMHG

## 2024-01-29 DIAGNOSIS — E78.5 DYSLIPIDEMIA ASSOCIATED WITH TYPE 2 DIABETES MELLITUS: ICD-10-CM

## 2024-01-29 DIAGNOSIS — E11.69 DYSLIPIDEMIA ASSOCIATED WITH TYPE 2 DIABETES MELLITUS: ICD-10-CM

## 2024-01-29 DIAGNOSIS — N95.2 VAGINAL ATROPHY: ICD-10-CM

## 2024-01-29 DIAGNOSIS — Z12.31 SCREENING MAMMOGRAM FOR BREAST CANCER: Primary | ICD-10-CM

## 2024-01-29 PROCEDURE — 99212 OFFICE O/P EST SF 10 MIN: CPT | Performed by: OBSTETRICS & GYNECOLOGY

## 2024-01-29 NOTE — PROGRESS NOTES
"Assessment/Plan:  Status post treatment for large vulvar folliculitis 2023 (resolved).  Hysterectomy BSO  Interstitial cystitis  DIANE  Colonoscopy with polyps     Plan: Rx mammogram.  Stop ERT and observe.  Recommend healthy diet and exercise.      Subjective: G3, P2 /C/S     Patient ID: Althea Kate is a 68 y.o. female presents to the office for follow-up exam following treatment for 10 days with antibiotics for a severe large folliculitis of the left upper labia majora.  Patient continue with warm soaks and the lesion resolved.  Denies any pelvic pain vaginal bleeding breast bowel or bladder problems.  Due for mammogram 2024.  Would like to stop her estrogen.        Objective:      /84   Ht 5' 5\" (1.651 m)   Wt 110 kg (242 lb)   BMI 40.27 kg/m²          Physical Exam  Genitourinary:     Comments: External genital slightly atrophic.  Follicular lesion resolved.  Vagina clear.  Vaginal cuff well supported.  No cystocele or rectocele.  Cervix uterus tubes and ovaries absent          "

## 2024-02-09 PROBLEM — Z12.11 COLON CANCER SCREENING: Status: RESOLVED | Noted: 2022-10-18 | Resolved: 2024-02-09

## 2024-03-07 DIAGNOSIS — K21.9 GASTROESOPHAGEAL REFLUX DISEASE WITHOUT ESOPHAGITIS: ICD-10-CM

## 2024-03-07 DIAGNOSIS — K22.70 BARRETT'S ESOPHAGUS WITHOUT DYSPLASIA: ICD-10-CM

## 2024-03-07 RX ORDER — FAMOTIDINE 40 MG/1
40 TABLET, FILM COATED ORAL
Qty: 90 TABLET | Refills: 1 | Status: SHIPPED | OUTPATIENT
Start: 2024-03-07

## 2024-04-05 ENCOUNTER — HOSPITAL ENCOUNTER (OUTPATIENT)
Dept: RADIOLOGY | Age: 69
Discharge: HOME | End: 2024-04-05
Payer: MEDICARE

## 2024-04-05 VITALS — HEIGHT: 65 IN | WEIGHT: 230 LBS | BODY MASS INDEX: 38.32 KG/M2

## 2024-04-05 DIAGNOSIS — Z12.31 VISIT FOR SCREENING MAMMOGRAM: ICD-10-CM

## 2024-04-05 PROCEDURE — 77063 BREAST TOMOSYNTHESIS BI: CPT

## 2024-04-05 PROCEDURE — 77067 SCR MAMMO BI INCL CAD: CPT

## 2024-07-25 ENCOUNTER — OFFICE VISIT (OUTPATIENT)
Dept: GASTROENTEROLOGY | Facility: CLINIC | Age: 69
End: 2024-07-25
Payer: MEDICARE

## 2024-07-25 VITALS
BODY MASS INDEX: 47.98 KG/M2 | HEART RATE: 77 BPM | DIASTOLIC BLOOD PRESSURE: 78 MMHG | WEIGHT: 244.4 LBS | SYSTOLIC BLOOD PRESSURE: 145 MMHG | HEIGHT: 60 IN

## 2024-07-25 DIAGNOSIS — Z80.0 FAMILY HISTORY OF COLON CANCER IN FATHER: ICD-10-CM

## 2024-07-25 DIAGNOSIS — K22.70 BARRETT'S ESOPHAGUS WITHOUT DYSPLASIA: ICD-10-CM

## 2024-07-25 DIAGNOSIS — K21.9 GASTROESOPHAGEAL REFLUX DISEASE WITHOUT ESOPHAGITIS: Primary | ICD-10-CM

## 2024-07-25 DIAGNOSIS — Z86.010 HX OF ADENOMATOUS COLONIC POLYPS: ICD-10-CM

## 2024-07-25 PROCEDURE — 99213 OFFICE O/P EST LOW 20 MIN: CPT | Performed by: INTERNAL MEDICINE

## 2024-07-25 PROCEDURE — G2211 COMPLEX E/M VISIT ADD ON: HCPCS | Performed by: INTERNAL MEDICINE

## 2024-07-25 RX ORDER — CELECOXIB 200 MG/1
200 CAPSULE ORAL 2 TIMES DAILY
COMMUNITY

## 2024-07-25 RX ORDER — OMEPRAZOLE 40 MG/1
40 CAPSULE, DELAYED RELEASE ORAL
Qty: 90 CAPSULE | Refills: 3 | Status: CANCELLED | OUTPATIENT
Start: 2024-07-25 | End: 2025-07-20

## 2024-07-25 RX ORDER — FLUTICASONE PROPIONATE 50 MCG
SPRAY, SUSPENSION (ML) NASAL
COMMUNITY
Start: 2024-05-30

## 2024-07-25 RX ORDER — PIOGLITAZONEHYDROCHLORIDE 30 MG/1
TABLET ORAL
COMMUNITY
Start: 2024-06-21

## 2024-07-25 NOTE — PROGRESS NOTES
[facility] - Outpatient Follow-up Note  Althea Kate 68 y.o. female MRN: [id number]  Encounter: [id number]          ASSESSMENT AND PLAN:      1. Gastroesophageal reflux disease without esophagitis  Well-controlled on omeprazole 40 mg nightly and will continue    2. James's esophagus without dysplasia  EGD [id number]    3. Family history of colon cancer in father  4. Hx of adenomatous colonic polyps  Adenomatous polyps on colonoscopy 2022.  Will plan to repeat in 2025    ______________________________________________________________________    SUBJECTIVE: Prior patient of Dr. Sheikh with longstanding GERD and James's esophagus.  Family history of colon cancer in her father at age 55.  2 adenomatous polyps on colonoscopy in 2022.  No dysplasia on esophageal biopsies.  GERD symptoms well-controlled on omeprazole 40 mg once daily in the evening.  He uses occasional OTC calcium antacids for breakthrough symptoms.  Denies dysphagia, odynophagia, unexplained weight loss, nausea or vomiting, fever or chills, jaundice or rash, change in bowel habit or blood in her stool.      REVIEW OF SYSTEMS:    Review of Systems   Constitutional: Negative for fever and weight loss.   Musculoskeletal:  Negative for myalgias.   Gastrointestinal:  Positive for abdominal pain and flatus. Negative for anorexia, constipation, diarrhea, hematochezia, melena, nausea and vomiting.   Genitourinary:  Positive for frequency. Negative for dysuria and hematuria.   Neurological:  Positive for headaches.    Answers submitted by the patient for this visit:  Abdominal Pain Questionnaire (Submitted on 7/22/2024)  Chief Complaint: Abdominal pain  Chronicity: recurrent  Onset: more than 1 year ago  Onset quality: gradual  Frequency: intermittently  Episode duration: 2 Hours  Progression since onset: waxing and waning  Pain location: epigastric region  Pain - numeric: 4/10  Pain quality: a sensation of fullness  Radiates  to: suprapubic region  arthralgias: Yes  belching: Yes  Aggravated by: nothing  Relieved by: bowel movements  Diagnostic workup: barium enema, GI consult, lower endoscopy, surgery, upper endoscopy        Historical Information   Past Medical History:   Diagnosis Date    Anxiety     Asthma     asthma related, used inhaler 1 week ago    James's esophagus     Colon polyp     Diabetes mellitus (HCC)     NIDDM    Dysphagia     1 month ago started    GERD (gastroesophageal reflux disease)     Glaucoma     Hyperlipidemia      Past Surgical History:   Procedure Laterality Date    ABDOMINOPLASTY      also 2 abdominal hernia repairs at same time    APPENDECTOMY      BREAST BIOPSY Left 2018    CATARACT EXTRACTION      right- 8/15/23 and left 23     SECTION      CHOLECYSTECTOMY      COLONOSCOPY      HYSTERECTOMY      OOPHORECTOMY      RI INS/RPLC PERPH SAC/GSTRC NPG/RCVR PCKT CRTJ&CONN N/A 2023    Procedure: STAGE 2 AXONIC, INSERTION OF NEUROSTIMULATOR, ELECTRONIC ANALYSIS OF NEUROSTIMULATOR;  Surgeon: Jimmy Lucas MD;  Location: [facility];  Service: Urology    REDUCTION MAMMAPLASTY Bilateral     SACRAL NERVE STIMULATOR PLACEMENT N/A 2023    Procedure: PART 1, INCISION FOR IMPLANTATION OF NEUROSTIMULATOR, ELECTRONIC ANALYSIS OF NEUROSTIMULATOR;  Surgeon: Jimmy Lucas MD;  Location: [facility];  Service: Urology    UPPER GASTROINTESTINAL ENDOSCOPY      US GUIDED BREAST BIOPSY LEFT COMPLETE Left 2016     Social History   Social History     Substance and Sexual Activity   Alcohol Use Yes    Alcohol/week: 3.0 standard drinks of alcohol    Types: 3 Glasses of wine per week    Comment: social, 2 x month     Social History     Substance and Sexual Activity   Drug Use Never     Social History     Tobacco Use   Smoking Status Former    Current packs/day: 0.00    Average packs/day: 0.5 packs/day for 10.0 years (5.0 ttl pk-yrs)    Types: Cigarettes    Start date: 1983    Quit date:  11/10/1991    Years since quittin.7   Smokeless Tobacco Never   Tobacco Comments    quit 20 years ago     Family History   Problem Relation Age of Onset    Diabetes Mother     Breast cancer Mother 58    Colon cancer Father 55    Diabetes Father     No Known Problems Daughter     No Known Problems Maternal Grandmother     No Known Problems Maternal Grandfather     No Known Problems Paternal Grandmother     No Known Problems Paternal Grandfather     Diabetes Brother     Diabetes Brother     Diabetes Brother     Diabetes Brother     Breast cancer Maternal Aunt 70    No Known Problems Paternal Aunt     No Known Problems Paternal Aunt     No Known Problems Paternal Aunt     No Known Problems Son     Diabetes Brother     Diabetes Brother        Meds/Allergies       Current Outpatient Medications:     albuterol (PROVENTIL HFA,VENTOLIN HFA) 90 mcg/act inhaler    aspirin 81 MG tablet    atorvastatin (LIPITOR) 20 mg tablet    Calcium Magnesium Zinc 333-133-5 MG TABS    celecoxib (CeleBREX) 200 mg capsule    estradiol (ESTRACE) 1 mg tablet    fluticasone (FLONASE) 50 mcg/act nasal spray    glimepiride (AMARYL) 2 mg tablet    latanoprost (XALATAN) 0.005 % ophthalmic solution    LORazepam (ATIVAN) 1 mg tablet    metFORMIN (GLUCOPHAGE-XR) 500 mg 24 hr tablet    omeprazole (PriLOSEC) 40 MG capsule    pioglitazone (ACTOS) 30 mg tablet    flunisolide (NASALIDE) 25 MCG/ACT (0.025%) SOLN    glucose blood test strip    scopolamine (TRANSDERM-SCOP) 1 mg/3 days TD 72 hr patch    Allergies   Allergen Reactions    Benzedrex [Propylhexedrine] Itching    Wound Dressing Adhesive Rash           Objective     Blood pressure 145/78, pulse 77, height 5' (1.524 m), weight 111 kg (244 lb 6.4 oz). Body mass index is 47.73 kg/m².      PHYSICAL EXAM:      Physical Exam  Vitals and nursing note reviewed.   Constitutional:       General: She is not in acute distress.     Appearance: She is not ill-appearing.   HENT:      Head: Normocephalic and  "atraumatic.   Eyes:      General: No scleral icterus.     Extraocular Movements: Extraocular movements intact.   Cardiovascular:      Rate and Rhythm: Normal rate.   Pulmonary:      Effort: Pulmonary effort is normal. No respiratory distress.   Skin:     General: Skin is warm and dry.      Coloration: Skin is not cyanotic.      Findings: No erythema.   Neurological:      General: No focal deficit present.      Mental Status: She is alert and oriented to person, place, and time.   Psychiatric:         Mood and Affect: Mood normal.         Behavior: Behavior normal.          Lab Results:   No visits with results within 1 Day(s) from this visit.   Latest known visit with results is:   Hospital Outpatient Visit on 10/02/2023   Component Date Value    Case Report 10/02/2023                      Value:Surgical Pathology Report                         Case: [id number]                                   Authorizing Provider:  Oskar Sheikh MD    Collected:           10/02/2023 0859              Ordering Location:     [facility] Received:            10/02/2023 [address] [place] [address]                                                                  Pathologist:           Remy Deluna MD                                                           Specimens:   A) - Stomach, cold bx antrum ck for hpy                                                             B) - Esophagus, cold bx lower esophagus reflux                                             Final Diagnosis 10/02/2023                      Value:A. Stomach, \"Stomach cold biopsy antrum check for H. pylori,\" Biopsy:                          - Antral mucosa with mild chronic inactive gastritis and rare foci of                           active gastritis                          - Negative for intestinal metaplasia                          - Negative for curvilinear Helicobacter microorganisms, supported by                      " "     immunohistochemistry                                                     B. Gastroesophageal junction, \"Cold biopsy lower esophagus, reflux,\"                           Biopsy:                          - Fragments of gastroesophageal junction mucosa with moderate chronic                           inflammation                          - Negative for intestinal metaplasia                          - Squamous mucosa with reactive changes, including acanthosis and basal                           cell hyperplasia                              Additional Information 10/02/2023                      Value:All reported additional testing was performed with appropriately reactive                           controls.  These tests were developed and their performance                           characteristics determined by [facility] or                           appropriate performing facility, though some tests may be performed on                           tissues which have not been validated for performance characteristics                           (such as staining performed on alcohol exposed cell blocks and decalcified                           tissues).  Results should be interpreted with caution and in the context                           of the patients’ clinical condition. These tests may not be cleared or                           approved by the U.S. Food and Drug Administration, though the FDA has                           determined that such clearance or approval is not necessary. These tests                           are used for clinical purposes and they should not be regarded as                           investigational or for research. This laboratory has been approved by CLIA                           88, designated as a high-complexity laboratory and is qualified to perform                           these tests.                          .Interpretation performed at [facility] " " [address] [place] [address]                              Gross Description 10/02/2023                      Value:A. The specimen is received in formalin, labeled with the patient's name                           and hospital number, and is designated \" stomach cold biopsy antrum check                           for H. pylori\".  The specimen consists of 2 white-tan friable soft tissue                           fragments ranging from 0.5 to 0.6 cm in greatest dimension.  Entirely                           submitted. Screened cassette.                          B. The specimen is received in formalin, labeled with the patient's name                           and hospital number, and is designated \" cold biopsy lower esophagus,                           reflux\".  The specimen consists of 3 colorless to tan friable soft tissue                           fragments ranging from 0.4 to 0.5 cm in greatest dimension.  Entirely                           submitted. Screened cassette.                                                    Note: The estimated total formalin fixation time based upon information                           provided by the submitting clinician and the standard processing schedule                           is under 72 hours.                                                    [facility]    Clinical Information 10/02/2023                      Value:FINDINGS:  · Abnormal mucosa in the antrum; performed cold forceps biopsy  · Irregular Z-line 38 cm from the incisors; performed cold forceps biopsy  · The remainder of a detailed exam otherwise unremarkable         Radiology Results:   XR PATELLA LEFT 3 VIEWS    Result Date: 7/18/2024  Narrative: This result has an attachment that is not available. Impression: Three-view radiographs left knee show moderate to severe medial compartment general arthritis 2 to 3 mm of joint space narrowing maintained.  No evidence for subluxation " loose bodies or fracture.   Central patella tracking is noted on merchant view

## 2024-09-03 ENCOUNTER — TELEPHONE (OUTPATIENT)
Age: 69
End: 2024-09-03

## 2024-09-03 NOTE — TELEPHONE ENCOUNTER
"LOV 07/25/24    Pt with history of James's, GERD, dysphagia reports \"I would like to schedule my scope asap\". Pt reports \"feeling something in my throat\"; denies breathing difficulty, trouble swallowing, nausea, vomiting. Pt states she is unsure if it is reflux related. Warm transferred to [facility] to schedule procedure.  "

## 2024-09-04 ENCOUNTER — OFFICE VISIT (OUTPATIENT)
Dept: GASTROENTEROLOGY | Facility: CLINIC | Age: 69
End: 2024-09-04
Payer: MEDICARE

## 2024-09-04 VITALS
OXYGEN SATURATION: 97 % | HEART RATE: 83 BPM | HEIGHT: 65 IN | SYSTOLIC BLOOD PRESSURE: 152 MMHG | DIASTOLIC BLOOD PRESSURE: 89 MMHG | WEIGHT: 243 LBS | BODY MASS INDEX: 40.48 KG/M2

## 2024-09-04 DIAGNOSIS — Z80.0 FAMILY HISTORY OF COLON CANCER: ICD-10-CM

## 2024-09-04 DIAGNOSIS — Z86.010 HISTORY OF COLON POLYPS: ICD-10-CM

## 2024-09-04 DIAGNOSIS — D64.9 ANEMIA, UNSPECIFIED TYPE: ICD-10-CM

## 2024-09-04 DIAGNOSIS — E66.01 OBESITY, MORBID, BMI 40.0-49.9 (HCC): ICD-10-CM

## 2024-09-04 DIAGNOSIS — R13.10 DYSPHAGIA, UNSPECIFIED TYPE: Primary | ICD-10-CM

## 2024-09-04 DIAGNOSIS — K21.9 GASTROESOPHAGEAL REFLUX DISEASE WITHOUT ESOPHAGITIS: ICD-10-CM

## 2024-09-04 PROCEDURE — 99214 OFFICE O/P EST MOD 30 MIN: CPT | Performed by: PHYSICIAN ASSISTANT

## 2024-09-04 RX ORDER — SEMAGLUTIDE 0.68 MG/ML
0.25 INJECTION, SOLUTION SUBCUTANEOUS
COMMUNITY
Start: 2024-08-08

## 2024-09-04 NOTE — PROGRESS NOTES
[facility] - Outpatient Follow-up Note  Althea Kate 69 y.o. female MRN: [id number]  Encounter: [id number]          ASSESSMENT AND PLAN:      69-year-old female who presents the office after last being seen for reflux and doing well less than 2 months ago now with worsening symptoms after starting Ozempic.    Atypical chest discomfort globus sensation, GERD  Obesity, BMI 40  Worsening symptoms over the past 1 week after starting Ozempic 3 weeks ago.  Reports constant globus sensation, feeling food slow to go down.  She had EGD less than 1 year ago with irregular Z-line however biopsies unremarkable.  She takes omeprazole once daily in the evening.    -Suspect worsening reflux symptoms and this starting Ozempic.  Patient would like to continue this for now.    - Recommend increasing omeprazole to 40 mg twice daily.    - We will obtain a barium swallow due to symptoms of significant globus sensation and dysphagia.  Her last EGD was 1 year ago.  Will hold off on repeat for right now unless symptoms do not improve or barium swallow shows any abnormalities.    -Recommend small meals throughout the day.  - Can use Pepcid 20 mg 1-2 times a day as needed for breakthrough symptoms.  Also consider trial of Gaviscon or Mylanta for breakthrough symptoms.    - If above workup is unremarkable, would consider repeat EGD.  If no improvement with this would consider needing to stop Ozempic    2.  Anemia  Patient had lab work done last week which showed mild anemia with hemoglobin of 10.8.  Last lab work prior to this with hemoglobin normal last year.  She has had some intermittent anemia in the past.  She denies any black or bloody stools.    - Patient reports her PCP ordered repeat lab work for at the end of this week to recheck hemoglobin.  - Patient will reach out to us based on these results    -She is up-to-date with EGD and colonoscopy however will be considering pursuing this sooner due to  symptoms above especially if anemia persists.    3.  Family history of colon cancer, father  4.  History of adenomatous colon polyps  Last colonoscopy 9/2022 with polyps removed and repeat recommended in 7 years.  However at last office visit 2 months ago due to family history of colon cancer patient was recommended to repeat in 3 years.    -Patient would not be due for repeat colonoscopy in 2025.  She reports she was recommended to have this done early in the year.  -Will await repeat hemoglobin and monitor worsening symptoms of reflux.  If these persist would likely plan for both EGD and colonoscopy early next year      Patient was recommended to follow up based on results above. Patient advised to reach out via [facility] with any questions or concerns in the meantime.   ______________________________________________________________________    SUBJECTIVE:      Althea Kate is a 69 y.o. female with asthma, type 2 diabetes, JOEY, GERD who presents the office for follow-up after last being seen less than 2 months ago for GERD.    Patient is doing well with reflux symptoms well-controlled on omeprazole 40 mg once daily.  She reports starting Ozempic 3 weeks ago.  For the past 1 week she reports significant chest discomfort and constant globus sensation.  She reports feeling foods and liquids slow to go down.  She is still taking omeprazole once daily in the evening.  She denies any black or bloody stools.    She had lab work last month with anemia with hemoglobin of 10.8.  Platelets elevated.  Liver enzymes normal.    Last EGD 10/2023 with irregular Z-line, gastritis.  Biopsies at that time were negative for intestinal metaplasia and were also negative for James's.  She was recommended repeat EGD in 3 to 5 years.  Colonoscopy 9/2022 with removal of polyps and repeat recommended in 7 years.    REVIEW OF SYSTEMS IS OTHERWISE NEGATIVE.      Historical Information   Past Medical History:   Diagnosis Date    Anxiety      Asthma     asthma related, used inhaler 1 week ago    James's esophagus     Colon polyp     Diabetes mellitus (HCC)     NIDDM    Dysphagia     1 month ago started    GERD (gastroesophageal reflux disease)     Glaucoma     Hyperlipidemia      Past Surgical History:   Procedure Laterality Date    ABDOMINOPLASTY      also 2 abdominal hernia repairs at same time    APPENDECTOMY      BREAST BIOPSY Left 2018    CATARACT EXTRACTION      right- 8/15/23 and left 23     SECTION      CHOLECYSTECTOMY      COLONOSCOPY      HYSTERECTOMY      OOPHORECTOMY      ME INS/RPLC PERPH SAC/GSTRC NPG/RCVR PCKT CRTJ&CONN N/A 2023    Procedure: STAGE 2 AXONIC, INSERTION OF NEUROSTIMULATOR, ELECTRONIC ANALYSIS OF NEUROSTIMULATOR;  Surgeon: Jimmy Lucas MD;  Location: [facility] OR;  Service: Urology    REDUCTION MAMMAPLASTY Bilateral     SACRAL NERVE STIMULATOR PLACEMENT N/A 2023    Procedure: PART 1, INCISION FOR IMPLANTATION OF NEUROSTIMULATOR, ELECTRONIC ANALYSIS OF NEUROSTIMULATOR;  Surgeon: Jimmy Lucas MD;  Location: [facility];  Service: Urology    UPPER GASTROINTESTINAL ENDOSCOPY      US GUIDED BREAST BIOPSY LEFT COMPLETE Left 2016     Social History   Social History     Substance and Sexual Activity   Alcohol Use Yes    Alcohol/week: 3.0 standard drinks of alcohol    Types: 3 Glasses of wine per week    Comment: social, 2 x month     Social History     Substance and Sexual Activity   Drug Use Never     Social History     Tobacco Use   Smoking Status Former    Current packs/day: 0.00    Average packs/day: 0.5 packs/day for 10.0 years (5.0 ttl pk-yrs)    Types: Cigarettes    Start date: 1983    Quit date: 11/10/1991    Years since quittin.8   Smokeless Tobacco Never   Tobacco Comments    quit 20 years ago     Family History   Problem Relation Age of Onset    Diabetes Mother     Breast cancer Mother 58    Colon cancer Father 55    Diabetes Father     No Known Problems Daughter   "   No Known Problems Maternal Grandmother     No Known Problems Maternal Grandfather     No Known Problems Paternal Grandmother     No Known Problems Paternal Grandfather     Diabetes Brother     Diabetes Brother     Diabetes Brother     Diabetes Brother     Breast cancer Maternal Aunt 70    No Known Problems Paternal Aunt     No Known Problems Paternal Aunt     No Known Problems Paternal Aunt     No Known Problems Son     Diabetes Brother     Diabetes Brother        Meds/Allergies       Current Outpatient Medications:     albuterol (PROVENTIL HFA,VENTOLIN HFA) 90 mcg/act inhaler    aspirin 81 MG tablet    atorvastatin (LIPITOR) 20 mg tablet    Calcium Magnesium Zinc 333-133-5 MG TABS    celecoxib (CeleBREX) 200 mg capsule    estradiol (ESTRACE) 1 mg tablet    glimepiride (AMARYL) 2 mg tablet    glucose blood test strip    latanoprost (XALATAN) 0.005 % ophthalmic solution    LORazepam (ATIVAN) 1 mg tablet    metFORMIN (GLUCOPHAGE-XR) 500 mg 24 hr tablet    omeprazole (PriLOSEC) 40 MG capsule    Ozempic, 0.25 or 0.5 MG/DOSE, 2 MG/3ML injection pen    pioglitazone (ACTOS) 30 mg tablet    flunisolide (NASALIDE) 25 MCG/ACT (0.025%) SOLN    Allergies   Allergen Reactions    Benzedrex [Propylhexedrine] Itching    Wound Dressing Adhesive Rash           Objective     Blood pressure 152/89, pulse 83, height 5' 5\" (1.651 m), weight 110 kg (243 lb), SpO2 97%. Body mass index is 40.44 kg/m².      PHYSICAL EXAM:      General Appearance:   Alert, cooperative, no distress   HEENT:   Normocephalic, atraumatic, anicteric.     Neck:  Supple, symmetrical, trachea midline   Lungs:   Clear to auscultation bilaterally; no rales, rhonchi or wheezing; respirations unlabored    Heart::   Regular rate and rhythm; no murmur, rub, or gallop.   Abdomen:   Reports some discomfort with palpation of the epigastric area.  Abdomen is soft and flat.  No rigidity or guarding.  Bowel sounds present.  Benign abdomen.   Genitalia:   Deferred    Rectal:   " "Deferred    Extremities:  No cyanosis, clubbing or edema    Pulses:  2+ and symmetric    Skin:  No jaundice, rashes, or lesions    Lymph nodes:  No palpable cervical lymphadenopathy        Lab Results:   No visits with results within 1 Day(s) from this visit.   Latest known visit with results is:   Hospital Outpatient Visit on 10/02/2023   Component Date Value    Case Report 10/02/2023                      Value:Surgical Pathology Report                         Case: [id number]                                   Authorizing Provider:  Oskar Sheikh MD    Collected:           10/02/2023 0859              Ordering Location:     [facility] Received:            10/02/2023 2134                                     [place]                                                                  Pathologist:           Remy Deluna MD                                                           Specimens:   A) - Stomach, cold bx antrum ck for hpy                                                             B) - Esophagus, cold bx lower esophagus reflux                                             Final Diagnosis 10/02/2023                      Value:A. Stomach, \"Stomach cold biopsy antrum check for H. pylori,\" Biopsy:                          - Antral mucosa with mild chronic inactive gastritis and rare foci of                           active gastritis                          - Negative for intestinal metaplasia                          - Negative for curvilinear Helicobacter microorganisms, supported by                           immunohistochemistry                                                     B. Gastroesophageal junction, \"Cold biopsy lower esophagus, reflux,\"                           Biopsy:                          - Fragments of gastroesophageal junction mucosa with moderate chronic                           inflammation                          - Negative for intestinal metaplasia               " "           - Squamous mucosa with reactive changes, including acanthosis and basal                           cell hyperplasia                              Additional Information 10/02/2023                      Value:All reported additional testing was performed with appropriately reactive                           controls.  These tests were developed and their performance                           characteristics determined by [facility] or                           appropriate performing facility, though some tests may be performed on                           tissues which have not been validated for performance characteristics                           (such as staining performed on alcohol exposed cell blocks and decalcified                           tissues).  Results should be interpreted with caution and in the context                           of the patients’ clinical condition. These tests may not be cleared or                           approved by the U.S. Food and Drug Administration, though the FDA has                           determined that such clearance or approval is not necessary. These tests                           are used for clinical purposes and they should not be regarded as                           investigational or for research. This laboratory has been approved by CLIA                           88, designated as a high-complexity laboratory and is qualified to perform                           these tests.                          .Interpretation performed at [facility] [address] [facility] [address] [place] [place] [address]                              Gross Description 10/02/2023                      Value:A. The specimen is received in formalin, labeled with the patient's name                           and hospital number, and is designated \" stomach cold biopsy antrum check                           for H. pylori\".  The specimen consists of " "2 white-tan friable soft tissue                           fragments ranging from 0.5 to 0.6 cm in greatest dimension.  Entirely                           submitted. Screened cassette.                          B. The specimen is received in formalin, labeled with the patient's name                           and hospital number, and is designated \" cold biopsy lower esophagus,                           reflux\".  The specimen consists of 3 colorless to tan friable soft tissue                           fragments ranging from 0.4 to 0.5 cm in greatest dimension.  Entirely                           submitted. Screened cassette.                                                    Note: The estimated total formalin fixation time based upon information                           provided by the submitting clinician and the standard processing schedule                           is under 72 hours.                                                    [facility]    Clinical Information 10/02/2023                      Value:FINDINGS:  · Abnormal mucosa in the antrum; performed cold forceps biopsy  · Irregular Z-line 38 cm from the incisors; performed cold forceps biopsy  · The remainder of a detailed exam otherwise unremarkable         Radiology Results:   XR knee 3 vw left non injury    Result Date: 8/28/2024  Narrative: This result has an attachment that is not available. Impression: Three-view radiographs left knee show bone-on-bone medial compartment joint space narrowing with early mild subluxation.  There is not significant increase sclerosis or osteophyte formation.  No evidence of loose bodies fracture.  Central patellar tracking is noted on merchant view  "

## 2024-09-04 NOTE — H&P (VIEW-ONLY)
[facility] - Outpatient Follow-up Note  Althea Kate 69 y.o. female MRN: [id number]  Encounter: [id number]          ASSESSMENT AND PLAN:      69-year-old female who presents the office after last being seen for reflux and doing well less than 2 months ago now with worsening symptoms after starting Ozempic.    Atypical chest discomfort globus sensation, GERD  Obesity, BMI 40  Worsening symptoms over the past 1 week after starting Ozempic 3 weeks ago.  Reports constant globus sensation, feeling food slow to go down.  She had EGD less than 1 year ago with irregular Z-line however biopsies unremarkable.  She takes omeprazole once daily in the evening.    -Suspect worsening reflux symptoms and this starting Ozempic.  Patient would like to continue this for now.    - Recommend increasing omeprazole to 40 mg twice daily.    - We will obtain a barium swallow due to symptoms of significant globus sensation and dysphagia.  Her last EGD was 1 year ago.  Will hold off on repeat for right now unless symptoms do not improve or barium swallow shows any abnormalities.    -Recommend small meals throughout the day.  - Can use Pepcid 20 mg 1-2 times a day as needed for breakthrough symptoms.  Also consider trial of Gaviscon or Mylanta for breakthrough symptoms.    - If above workup is unremarkable, would consider repeat EGD.  If no improvement with this would consider needing to stop Ozempic    2.  Anemia  Patient had lab work done last week which showed mild anemia with hemoglobin of 10.8.  Last lab work prior to this with hemoglobin normal last year.  She has had some intermittent anemia in the past.  She denies any black or bloody stools.    - Patient reports her PCP ordered repeat lab work for at the end of this week to recheck hemoglobin.  - Patient will reach out to us based on these results    -She is up-to-date with EGD and colonoscopy however will be considering pursuing this sooner due to  symptoms above especially if anemia persists.    3.  Family history of colon cancer, father  4.  History of adenomatous colon polyps  Last colonoscopy 9/2022 with polyps removed and repeat recommended in 7 years.  However at last office visit 2 months ago due to family history of colon cancer patient was recommended to repeat in 3 years.    -Patient would not be due for repeat colonoscopy in 2025.  She reports she was recommended to have this done early in the year.  -Will await repeat hemoglobin and monitor worsening symptoms of reflux.  If these persist would likely plan for both EGD and colonoscopy early next year      Patient was recommended to follow up based on results above. Patient advised to reach out via [employer] with any questions or concerns in the meantime.   ______________________________________________________________________    SUBJECTIVE:      Althea Kate is a 69 y.o. female with asthma, type 2 diabetes, JOEY, GERD who presents the office for follow-up after last being seen less than 2 months ago for GERD.    Patient is doing well with reflux symptoms well-controlled on omeprazole 40 mg once daily.  She reports starting Ozempic 3 weeks ago.  For the past 1 week she reports significant chest discomfort and constant globus sensation.  She reports feeling foods and liquids slow to go down.  She is still taking omeprazole once daily in the evening.  She denies any black or bloody stools.    She had lab work last month with anemia with hemoglobin of 10.8.  Platelets elevated.  Liver enzymes normal.    Last EGD 10/2023 with irregular Z-line, gastritis.  Biopsies at that time were negative for intestinal metaplasia and were also negative for James's.  She was recommended repeat EGD in 3 to 5 years.  Colonoscopy 9/2022 with removal of polyps and repeat recommended in 7 years.    REVIEW OF SYSTEMS IS OTHERWISE NEGATIVE.      Historical Information   Past Medical History:   Diagnosis Date    Anxiety      Asthma     asthma related, used inhaler 1 week ago    James's esophagus     Colon polyp     Diabetes mellitus (HCC)     NIDDM    Dysphagia     1 month ago started    GERD (gastroesophageal reflux disease)     Glaucoma     Hyperlipidemia      Past Surgical History:   Procedure Laterality Date    ABDOMINOPLASTY      also 2 abdominal hernia repairs at same time    APPENDECTOMY      BREAST BIOPSY Left 2018    CATARACT EXTRACTION      right- 8/15/23 and left 23     SECTION      CHOLECYSTECTOMY      COLONOSCOPY      HYSTERECTOMY      OOPHORECTOMY      NM INS/RPLC PERPH SAC/GSTRC NPG/RCVR PCKT CRTJ&CONN N/A 2023    Procedure: STAGE 2 AXONIC, INSERTION OF NEUROSTIMULATOR, ELECTRONIC ANALYSIS OF NEUROSTIMULATOR;  Surgeon: Jimmy Lucas MD;  Location: [facility] OR;  Service: Urology    REDUCTION MAMMAPLASTY Bilateral     SACRAL NERVE STIMULATOR PLACEMENT N/A 2023    Procedure: PART 1, INCISION FOR IMPLANTATION OF NEUROSTIMULATOR, ELECTRONIC ANALYSIS OF NEUROSTIMULATOR;  Surgeon: Jimmy Lucas MD;  Location: [facility];  Service: Urology    UPPER GASTROINTESTINAL ENDOSCOPY      US GUIDED BREAST BIOPSY LEFT COMPLETE Left 2016     Social History   Social History     Substance and Sexual Activity   Alcohol Use Yes    Alcohol/week: 3.0 standard drinks of alcohol    Types: 3 Glasses of wine per week    Comment: social, 2 x month     Social History     Substance and Sexual Activity   Drug Use Never     Social History     Tobacco Use   Smoking Status Former    Current packs/day: 0.00    Average packs/day: 0.5 packs/day for 10.0 years (5.0 ttl pk-yrs)    Types: Cigarettes    Start date: 1983    Quit date: 11/10/1991    Years since quittin.8   Smokeless Tobacco Never   Tobacco Comments    quit 20 years ago     Family History   Problem Relation Age of Onset    Diabetes Mother     Breast cancer Mother 58    Colon cancer Father 55    Diabetes Father     No Known Problems Daughter   "   No Known Problems Maternal Grandmother     No Known Problems Maternal Grandfather     No Known Problems Paternal Grandmother     No Known Problems Paternal Grandfather     Diabetes Brother     Diabetes Brother     Diabetes Brother     Diabetes Brother     Breast cancer Maternal Aunt 70    No Known Problems Paternal Aunt     No Known Problems Paternal Aunt     No Known Problems Paternal Aunt     No Known Problems Son     Diabetes Brother     Diabetes Brother        Meds/Allergies       Current Outpatient Medications:     albuterol (PROVENTIL HFA,VENTOLIN HFA) 90 mcg/act inhaler    aspirin 81 MG tablet    atorvastatin (LIPITOR) 20 mg tablet    Calcium Magnesium Zinc 333-133-5 MG TABS    celecoxib (CeleBREX) 200 mg capsule    estradiol (ESTRACE) 1 mg tablet    glimepiride (AMARYL) 2 mg tablet    glucose blood test strip    latanoprost (XALATAN) 0.005 % ophthalmic solution    LORazepam (ATIVAN) 1 mg tablet    metFORMIN (GLUCOPHAGE-XR) 500 mg 24 hr tablet    omeprazole (PriLOSEC) 40 MG capsule    Ozempic, 0.25 or 0.5 MG/DOSE, 2 MG/3ML injection pen    pioglitazone (ACTOS) 30 mg tablet    flunisolide (NASALIDE) 25 MCG/ACT (0.025%) SOLN    Allergies   Allergen Reactions    Benzedrex [Propylhexedrine] Itching    Wound Dressing Adhesive Rash           Objective     Blood pressure 152/89, pulse 83, height 5' 5\" (1.651 m), weight 110 kg (243 lb), SpO2 97%. Body mass index is 40.44 kg/m².      PHYSICAL EXAM:      General Appearance:   Alert, cooperative, no distress   HEENT:   Normocephalic, atraumatic, anicteric.     Neck:  Supple, symmetrical, trachea midline   Lungs:   Clear to auscultation bilaterally; no rales, rhonchi or wheezing; respirations unlabored    Heart::   Regular rate and rhythm; no murmur, rub, or gallop.   Abdomen:   Reports some discomfort with palpation of the epigastric area.  Abdomen is soft and flat.  No rigidity or guarding.  Bowel sounds present.  Benign abdomen.   Genitalia:   Deferred    Rectal:   " "Deferred    Extremities:  No cyanosis, clubbing or edema    Pulses:  2+ and symmetric    Skin:  No jaundice, rashes, or lesions    Lymph nodes:  No palpable cervical lymphadenopathy        Lab Results:   No visits with results within 1 Day(s) from this visit.   Latest known visit with results is:   Hospital Outpatient Visit on 10/02/2023   Component Date Value    Case Report 10/02/2023                      Value:Surgical Pathology Report                         Case: [id number]                                   Authorizing Provider:  Oskar Sheikh MD    Collected:           10/02/2023 0859              Ordering Location:     [facility] Received:            10/02/2023 2134                                     [place]                                                                  Pathologist:           Remy Deluna MD                                                           Specimens:   A) - Stomach, cold bx antrum ck for hpy                                                             B) - Esophagus, cold bx lower esophagus reflux                                             Final Diagnosis 10/02/2023                      Value:A. Stomach, \"Stomach cold biopsy antrum check for H. pylori,\" Biopsy:                          - Antral mucosa with mild chronic inactive gastritis and rare foci of                           active gastritis                          - Negative for intestinal metaplasia                          - Negative for curvilinear Helicobacter microorganisms, supported by                           immunohistochemistry                                                     B. Gastroesophageal junction, \"Cold biopsy lower esophagus, reflux,\"                           Biopsy:                          - Fragments of gastroesophageal junction mucosa with moderate chronic                           inflammation                          - Negative for intestinal metaplasia               " "           - Squamous mucosa with reactive changes, including acanthosis and basal                           cell hyperplasia                              Additional Information 10/02/2023                      Value:All reported additional testing was performed with appropriately reactive                           controls.  These tests were developed and their performance                           characteristics determined by [facility] or                           appropriate performing facility, though some tests may be performed on                           tissues which have not been validated for performance characteristics                           (such as staining performed on alcohol exposed cell blocks and decalcified                           tissues).  Results should be interpreted with caution and in the context                           of the patients’ clinical condition. These tests may not be cleared or                           approved by the U.S. Food and Drug Administration, though the FDA has                           determined that such clearance or approval is not necessary. These tests                           are used for clinical purposes and they should not be regarded as                           investigational or for research. This laboratory has been approved by CLIA                           88, designated as a high-complexity laboratory and is qualified to perform                           these tests.                          .Interpretation performed at [facility] [address] [facility] [address] [place] [address]                              Gross Description 10/02/2023                      Value:A. The specimen is received in formalin, labeled with the patient's name                           and hospital number, and is designated \" stomach cold biopsy antrum check                           for H. pylori\".  The specimen consists of " "2 white-tan friable soft tissue                           fragments ranging from 0.5 to 0.6 cm in greatest dimension.  Entirely                           submitted. Screened cassette.                          B. The specimen is received in formalin, labeled with the patient's name                           and hospital number, and is designated \" cold biopsy lower esophagus,                           reflux\".  The specimen consists of 3 colorless to tan friable soft tissue                           fragments ranging from 0.4 to 0.5 cm in greatest dimension.  Entirely                           submitted. Screened cassette.                                                    Note: The estimated total formalin fixation time based upon information                           provided by the submitting clinician and the standard processing schedule                           is under 72 hours.                                                    [facility]    Clinical Information 10/02/2023                      Value:FINDINGS:  · Abnormal mucosa in the antrum; performed cold forceps biopsy  · Irregular Z-line 38 cm from the incisors; performed cold forceps biopsy  · The remainder of a detailed exam otherwise unremarkable         Radiology Results:   XR knee 3 vw left non injury    Result Date: 8/28/2024  Narrative: This result has an attachment that is not available. Impression: Three-view radiographs left knee show bone-on-bone medial compartment joint space narrowing with early mild subluxation.  There is not significant increase sclerosis or osteophyte formation.  No evidence of loose bodies fracture.  Central patellar tracking is noted on merchant view  "

## 2024-09-06 ENCOUNTER — HOSPITAL ENCOUNTER (OUTPATIENT)
Dept: RADIOLOGY | Facility: HOSPITAL | Age: 69
Discharge: HOME | End: 2024-09-06
Payer: MEDICARE

## 2024-09-06 DIAGNOSIS — K21.9 GASTROESOPHAGEAL REFLUX DISEASE WITHOUT ESOPHAGITIS: ICD-10-CM

## 2024-09-06 DIAGNOSIS — R13.10 DYSPHAGIA, UNSPECIFIED TYPE: ICD-10-CM

## 2024-09-06 PROCEDURE — 74220 X-RAY XM ESOPHAGUS 1CNTRST: CPT

## 2024-09-10 ENCOUNTER — NURSE TRIAGE (OUTPATIENT)
Age: 69
End: 2024-09-10

## 2024-09-10 DIAGNOSIS — R19.7 DIARRHEA, UNSPECIFIED TYPE: ICD-10-CM

## 2024-09-10 DIAGNOSIS — R19.5 DARK STOOLS: Primary | ICD-10-CM

## 2024-09-10 NOTE — TELEPHONE ENCOUNTER
"Please advise.  Any recommendations?    LOV:  9/4/24    Colonoscopy:  9/6/22  FINDINGS:  Two polyps measuring from 5 mm up to 6 mm in the ascending colon  The remainder of a detailed exam was within normal limits including the appendix opening    EGD:  10/2/23  FINDINGS:  Abnormal mucosa in the antrum; performed cold forceps biopsy  Irregular Z-line 38 cm from the incisors; performed cold forceps biopsy  The remainder of a detailed exam otherwise unremarkable    History:   Atypical chest discomfort globus sensation, GERD  Obesity, BMI 40  2.  Anemia  3.  Family history of colon cancer, father  4.  History of adenomatous colon polyps    Patient started omeprazole bid on Saturday, was taking nightly.  Patient states she started with blackish water bowel movements yesterday  Went approx 5 times yesterday and then again twice today.  Patient states has cramping d/t diarrhea.  Patient states that her blood count is already low and concerns of bleeding somewhere.  Discussed going to ED if stool is black d/t possible bleeding and if has low blood count.  Patient states she will go if has another bm that is black.  Patient sees PCP tomorrow so will discuss with him as well.          Reason for Disposition   Bloody, black, or tarry bowel movements (Exception: chronic-unchanged black-grey bowel movements and is taking iron pills or Pepto-Bismol)    Answer Assessment - Initial Assessment Questions  1. APPEARANCE of BLOOD: \"What color is it?\" \"Is it passed separately, on the surface of the stool, or mixed in with the stool?\"       Black.   2. AMOUNT: \"How much blood was passed?\"       All stool is black.    3. FREQUENCY: \"How many times has blood been passed with the stools?\"       5 times yesterday and 2 times today.   4. ONSET: \"When was the blood first seen in the stools?\" (Days or weeks)       yesterday  5. DIARRHEA: \"Is there also some diarrhea?\" If Yes, ask: \"How many diarrhea stools were passed in past 24 hours?\"       7 " "times  6. CONSTIPATION: \"Do you have constipation?\" If Yes, ask: \"How bad is it?\"      denies  7. RECURRENT SYMPTOMS: \"Have you had blood in your stools before?\" If Yes, ask: \"When was the last time?\" and \"What happened that time?\"       Years ago.   8. BLOOD THINNERS: \"Do you take any blood thinners?\" (e.g., Coumadin/warfarin, Pradaxa/dabigatran, aspirin)      aspirin  9. OTHER SYMPTOMS: \"Do you have any other symptoms?\"  (e.g., abdominal pain, vomiting, dizziness, fever)      denies    Protocols used: Rectal Bleeding-ADULT-[place]    "

## 2024-09-10 NOTE — TELEPHONE ENCOUNTER
I called and spoke to patient.  She reports new diarrhea over the past 2 days.  She reports this was black at first however today is now green/brown.  She did use Pepto today.  She is on Ozempic which is new for a few weeks  She is seeing her family doctor tomorrow.  I recommend repeat lab work to check her hemoglobin again.  It has been stable over the past month.  The biggest symptom at this time is watery diarrhea.  I recommend C. difficile and stool enteric panel testing.  She did start Ozempic which is new and I have concerns that this could be causing a lot of her GI symptoms including diarrhea.  I advised her that if she has any more black stools I recommend she go right to the emergency room.  If she continues to be anemic we will obtain repeat EGD and colonoscopy.  Unfortunately she is leaving for vacation in less than 1 week and would not be able to get them done before then.  She reports otherwise she is feeling well, no dizziness, lightheadedness.  She will continue PPI twice daily.  Again stressed the importance of going to the emergency room if symptoms do not improve.  She reports understanding and will see PCP tomorrow and obtain lab work and stool test that I ordered.

## 2024-09-12 NOTE — TELEPHONE ENCOUNTER
Patient returned phone call and I went over the recommendations. Patient expressed frustration that her endocrinologist said that the medication is not causing any of these symptoms.  She does mention that she did not take the ozempic and that her stools were brown today.

## 2024-09-12 NOTE — TELEPHONE ENCOUNTER
Pt made aware and verbalized understanding. States she spoke with PCP and they will not clear her for 10/1/24 knee surgery unless she has an EGD. States she is ok moving surgery date if needed.    Call home # today. Call cell tomorrow as she will be on vacation.

## 2024-09-12 NOTE — TELEPHONE ENCOUNTER
Pt. Feeling better but still having symptoms, pt. Did not eat breakfast yet today but had some liquid dark stool, denies alarming symptoms,  pt. Got lab work and stool studies done yesterday , CBC and BMP available for review right now

## 2024-09-12 NOTE — TELEPHONE ENCOUNTER
Please let patient know that her hemoglobin remained stable which is good news.  Stool tests seem to still be pending.  I would recommend that she hold her dose of Ozempic this week as I am concerned it may be causing or could worsen some of her symptoms.  However, if she has any more dark stools while NOT taking Pepto I again recommend she go to the emergency room for concern of bleeding

## 2024-09-12 NOTE — TELEPHONE ENCOUNTER
Pt transferred to nurse line.  Pt states she received a message to call the office.  Reviewed chart and most recent message was sent to provider for recommendations.   I did review provider's recommendations from 1218 today with pt, but she is concerned that she will be leaving for [place] on Sunday without instructions.   Pt also states that she forgot and DID take Ozempic today.  Advise to hold per recommendations.  Pt is also concerned that stool testing is not back and feels provider marked it urgent.  Advised that because of the type of testing, it may take a day or two to come back, even if marked urgent.

## 2024-09-12 NOTE — TELEPHONE ENCOUNTER
Please let patient know that her stool studies have come back and are negative for infection.  She can use Imodium now as needed.  Recommend continuing Prilosec twice daily.  Can use Pepcid 20 mg 1-2 times a day as needed.  Recommend keeping her diet very bland/low fiber.  Again, if she has any recurrence of black stools at any point even if she is on vacation she should go to the nearest emergency room.  Thank you

## 2024-09-12 NOTE — TELEPHONE ENCOUNTER
Called patient, no answer, left message requesting she call back to report in on how she is doing. Patient did see primary care yesterday and labs/stool studies available for review in [facility]where.

## 2024-09-13 ENCOUNTER — TELEPHONE (OUTPATIENT)
Age: 69
End: 2024-09-13

## 2024-09-13 ENCOUNTER — PREP FOR PROCEDURE (OUTPATIENT)
Dept: GASTROENTEROLOGY | Facility: AMBULARY SURGERY CENTER | Age: 69
End: 2024-09-13

## 2024-09-13 DIAGNOSIS — D64.9 ANEMIA, UNSPECIFIED TYPE: ICD-10-CM

## 2024-09-13 DIAGNOSIS — R19.5 DARK STOOLS: Primary | ICD-10-CM

## 2024-09-13 NOTE — TELEPHONE ENCOUNTER
Patients GI provider:  STACIE Molina    Number to return call: ([phone]    Reason for call: Pt called to speak with Rafaela Steen as her PCP wants an endoscopic ASAP. Please have Rafaela reach out to pt    Scheduled procedure/appointment date if applicable: Apt/procedure N/A

## 2024-09-13 NOTE — TELEPHONE ENCOUNTER
I called and spoke to patient.  I told her that I sent a message to the clerical bin this morning.  They will be the ones to reach out to her to get her her procedure the week she comes back from vacation.  She reports she is doing pretty well right now just some occasional abdominal cramping.  She is aware to call us with any questions or concerns over her vacation as she leaves this weekend.

## 2024-09-13 NOTE — TELEPHONE ENCOUNTER
Please call patient to schedule EGD.  It looks like this should be with Dr. Renner since she saw him in office.  She will be on vacation for the next week however ideally hopefully we can fit her into the schedule the following week or two. She is on Ozempic. Thank you

## 2024-09-13 NOTE — TELEPHONE ENCOUNTER
Went to call pt to schedule EGD, but procedure was in the process of already being scheduled. Pt has since been scheduled with Dr. Renner for 10/3/24.

## 2024-09-19 ENCOUNTER — ANESTHESIA (OUTPATIENT)
Dept: ANESTHESIOLOGY | Facility: HOSPITAL | Age: 69
End: 2024-09-19

## 2024-09-19 ENCOUNTER — ANESTHESIA EVENT (OUTPATIENT)
Dept: ANESTHESIOLOGY | Facility: HOSPITAL | Age: 69
End: 2024-09-19

## 2024-09-24 ENCOUNTER — TELEPHONE (OUTPATIENT)
Dept: GASTROENTEROLOGY | Facility: CLINIC | Age: 69
End: 2024-09-24

## 2024-09-24 NOTE — TELEPHONE ENCOUNTER
lmom confirming pt's egd scheduled on 10/3/24 at [facility] with Dr Renner .  Informed [facility] would be calling this pt with the arrival time.    Informed would need a  the day of the procedure due to being under sedation. I asked pt to please call back if has not received instructions or if has any questions.      Pt should hold her ozempic for 7 days prior to procedure and should have diabetic and egd instructions as well. Sb

## 2024-09-26 ENCOUNTER — TELEPHONE (OUTPATIENT)
Dept: GASTROENTEROLOGY | Facility: CLINIC | Age: 69
End: 2024-09-26

## 2024-09-26 NOTE — TELEPHONE ENCOUNTER
I spoke to pt who has her instructions and a  . She is aware [facility] will be calling her day before procedure for her arrival time. She has a bladder stimulator and has a question as to whether she needs to bring the controller. I told her to ask [facility] when they call with her arrival time. Alex

## 2024-10-02 RX ORDER — SODIUM CHLORIDE, SODIUM LACTATE, POTASSIUM CHLORIDE, CALCIUM CHLORIDE 600; 310; 30; 20 MG/100ML; MG/100ML; MG/100ML; MG/100ML
75 INJECTION, SOLUTION INTRAVENOUS CONTINUOUS
Status: CANCELLED | OUTPATIENT
Start: 2024-10-02

## 2024-10-03 ENCOUNTER — ANESTHESIA (OUTPATIENT)
Dept: GASTROENTEROLOGY | Facility: AMBULARY SURGERY CENTER | Age: 69
End: 2024-10-03
Payer: MEDICARE

## 2024-10-03 ENCOUNTER — HOSPITAL ENCOUNTER (OUTPATIENT)
Dept: GASTROENTEROLOGY | Facility: AMBULARY SURGERY CENTER | Age: 69
Setting detail: OUTPATIENT SURGERY
End: 2024-10-03
Attending: INTERNAL MEDICINE
Payer: MEDICARE

## 2024-10-03 VITALS
DIASTOLIC BLOOD PRESSURE: 76 MMHG | OXYGEN SATURATION: 96 % | HEART RATE: 74 BPM | TEMPERATURE: 97.7 F | RESPIRATION RATE: 18 BRPM | SYSTOLIC BLOOD PRESSURE: 148 MMHG

## 2024-10-03 DIAGNOSIS — D64.9 ANEMIA, UNSPECIFIED TYPE: ICD-10-CM

## 2024-10-03 DIAGNOSIS — R19.5 DARK STOOLS: ICD-10-CM

## 2024-10-03 LAB — GLUCOSE SERPL-MCNC: 143 MG/DL (ref 65–140)

## 2024-10-03 PROCEDURE — 43239 EGD BIOPSY SINGLE/MULTIPLE: CPT | Performed by: INTERNAL MEDICINE

## 2024-10-03 PROCEDURE — 82948 REAGENT STRIP/BLOOD GLUCOSE: CPT

## 2024-10-03 PROCEDURE — 88305 TISSUE EXAM BY PATHOLOGIST: CPT | Performed by: PATHOLOGY

## 2024-10-03 PROCEDURE — 88342 IMHCHEM/IMCYTCHM 1ST ANTB: CPT | Performed by: PATHOLOGY

## 2024-10-03 RX ORDER — PROPOFOL 10 MG/ML
INJECTION, EMULSION INTRAVENOUS AS NEEDED
Status: DISCONTINUED | OUTPATIENT
Start: 2024-10-03 | End: 2024-10-03

## 2024-10-03 RX ORDER — LIDOCAINE HYDROCHLORIDE 10 MG/ML
INJECTION, SOLUTION EPIDURAL; INFILTRATION; INTRACAUDAL; PERINEURAL AS NEEDED
Status: DISCONTINUED | OUTPATIENT
Start: 2024-10-03 | End: 2024-10-03

## 2024-10-03 RX ORDER — SODIUM CHLORIDE, SODIUM LACTATE, POTASSIUM CHLORIDE, CALCIUM CHLORIDE 600; 310; 30; 20 MG/100ML; MG/100ML; MG/100ML; MG/100ML
75 INJECTION, SOLUTION INTRAVENOUS CONTINUOUS
Status: DISCONTINUED | OUTPATIENT
Start: 2024-10-03 | End: 2024-10-07 | Stop reason: HOSPADM

## 2024-10-03 RX ADMIN — SODIUM CHLORIDE, SODIUM LACTATE, POTASSIUM CHLORIDE, AND CALCIUM CHLORIDE 75 ML/HR: .6; .31; .03; .02 INJECTION, SOLUTION INTRAVENOUS at 12:48

## 2024-10-03 RX ADMIN — PROPOFOL 150 MG: 10 INJECTION, EMULSION INTRAVENOUS at 13:21

## 2024-10-03 RX ADMIN — LIDOCAINE HYDROCHLORIDE 5 ML: 10 INJECTION, SOLUTION EPIDURAL; INFILTRATION; INTRACAUDAL; PERINEURAL at 13:21

## 2024-10-03 RX ADMIN — PROPOFOL 50 MG: 10 INJECTION, EMULSION INTRAVENOUS at 13:22

## 2024-10-03 NOTE — ANESTHESIA PREPROCEDURE EVALUATION
Procedure:  EGD    Relevant Problems   ENDO   (+) Diabetes 1.5, managed as type 2 (HCC)   (+) Dyslipidemia associated with type 2 diabetes mellitus  (HCC)   (+) Type 2 diabetes mellitus, without long-term current use of insulin (HCC)      GI/HEPATIC   (+) Dysphagia   (+) Gastroesophageal reflux disease without esophagitis      HEMATOLOGY   (+) Iron deficiency anemia      NEURO/PSYCH   (+) Generalized anxiety disorder      PULMONARY   (+) Mild intermittent asthma        Physical Exam    Airway    Mallampati score: II  TM Distance: >3 FB  Neck ROM: full     Dental       Cardiovascular  Rhythm: regular, Rate: normal    Pulmonary   Breath sounds clear to auscultation    Other Findings  post-pubertal.      Anesthesia Plan  ASA Score- 2     Anesthesia Type- IV sedation with anesthesia with ASA Monitors.         Additional Monitors:     Airway Plan:            Plan Factors-    Chart reviewed.        Patient is not a current smoker.              Induction- intravenous.    Postoperative Plan-     Perioperative Resuscitation Plan -  The DNR status was discussed with the patient and/or POA, and Level 2 code status (DNR but accepts intubation) will be maintained throughout the perioperative period.      Informed Consent- Anesthetic plan and risks discussed with patient.  I personally reviewed this patient with the CRNA. Discussed and agreed on the Anesthesia Plan with the CRNA..

## 2024-10-03 NOTE — ANESTHESIA POSTPROCEDURE EVALUATION
Post-Op Assessment Note    CV Status:  Stable         Mental Status:  Alert   PONV Controlled:  Controlled   Airway Patency:  Patent     Post Op Vitals Reviewed: Yes    No anethesia notable event occurred.    Staff: CRNA               BP   165/94   Temp      Pulse  82   Resp   20   SpO2   99

## 2024-10-03 NOTE — INTERVAL H&P NOTE
H&P reviewed. After examining the patient I find no changes in the patients condition since the H&P had been written.    Vitals:    10/03/24 1239   BP: (!) 179/83   Pulse: 85   Resp: 16   Temp: 97.7 °F (36.5 °C)   SpO2: 95%

## 2024-10-07 ENCOUNTER — PATIENT OUTREACH (OUTPATIENT)
Dept: GASTROENTEROLOGY | Facility: CLINIC | Age: 69
End: 2024-10-07

## 2024-10-07 NOTE — PROGRESS NOTES
Please let patient know I am glad her EGD looked good and good luck with her surgery tomorrow. As discussed with Dr. Renner after EGD, she should schedule a colonoscopy due to the anemia when she recovers from her knee replacement. I placed order. She is on Ozempic and pioglitazone and metformin. No blood thinners. Can use golytely. Thank you!

## 2024-10-08 ENCOUNTER — TELEPHONE (OUTPATIENT)
Dept: GASTROENTEROLOGY | Facility: CLINIC | Age: 69
End: 2024-10-08

## 2024-10-08 PROCEDURE — 88342 IMHCHEM/IMCYTCHM 1ST ANTB: CPT | Performed by: PATHOLOGY

## 2024-10-08 PROCEDURE — 88305 TISSUE EXAM BY PATHOLOGIST: CPT | Performed by: PATHOLOGY

## 2024-10-08 NOTE — TELEPHONE ENCOUNTER
Rafaela Steen PA-C at 10/7/2024  3:09 PM    Status: Signed   Please let patient know I am glad her EGD looked good and good luck with her surgery tomorrow. As discussed with Dr. Renner after EGD, she should schedule a colonoscopy due to the anemia when she recovers from her knee replacement. I placed order. She is on Ozempic and pioglitazone and metformin. No blood thinners. Can use golytely. Thank you!          Will call pt in a few days after her knee surgery. Alex

## 2024-10-08 NOTE — RESULT ENCOUNTER NOTE
Please call the patient regarding results.  Gastric and duodenal biopsies all benign.  No evidence of significant inflammation or H. pylori infection.  No evidence of celiac disease.

## 2024-10-09 ENCOUNTER — TELEPHONE (OUTPATIENT)
Dept: GASTROENTEROLOGY | Facility: CLINIC | Age: 69
End: 2024-10-09

## 2024-10-09 NOTE — TELEPHONE ENCOUNTER
----- Message from Kelsey NARANJO sent at 10/9/2024 10:58 AM EDT -----  I left message per consent. Patient aware and educated. Please call patient to schedule colonoscopy for further evaluation of iron deficiency anemia. Thank you

## 2024-10-09 NOTE — TELEPHONE ENCOUNTER
Scheduled date of colonoscopy (as of today):12/30/24  Physician performing colonoscopy:Dr Renner   Location of colonoscopy:[facility]   Bowel prep reviewed with patient:golytely   Instructions reviewed with patient by:sb  Clearances:none    Pt scheduled and requests golytely prep sent to pharmacy on file after 12/15/24     Diabetic and prep instructions to be mailed to pt.   Pt on ozempic aware to hold 7 days prior to procedure. Sb      Reminder in chart to call pt to schedule colon. Sb

## 2024-12-15 ENCOUNTER — ANESTHESIA (OUTPATIENT)
Dept: ANESTHESIOLOGY | Facility: HOSPITAL | Age: 69
End: 2024-12-15

## 2024-12-15 ENCOUNTER — ANESTHESIA EVENT (OUTPATIENT)
Dept: ANESTHESIOLOGY | Facility: HOSPITAL | Age: 69
End: 2024-12-15

## 2024-12-16 DIAGNOSIS — D64.9 ANEMIA, UNSPECIFIED TYPE: Primary | ICD-10-CM

## 2024-12-20 ENCOUNTER — TELEPHONE (OUTPATIENT)
Dept: GASTROENTEROLOGY | Facility: CLINIC | Age: 69
End: 2024-12-20

## 2024-12-20 NOTE — TELEPHONE ENCOUNTER
I spoke to pt confirming pt's colonoscopy scheduled on 12/30/24 at [facility] with Dr Renner .  Informed [facility] would be calling this pt with the arrival time.  Informed of clear liquid diet day prior as well as the bowel cleansing preparation.  Informed would need a  the day of the procedure due to being under sedation. I asked pt to please call back if has not received instructions or if has any questions.      Pt to check to see if her prep is at the pharmacy. If she needs it resent she will call back. Sb

## 2024-12-30 ENCOUNTER — HOSPITAL ENCOUNTER (OUTPATIENT)
Dept: GASTROENTEROLOGY | Facility: AMBULARY SURGERY CENTER | Age: 69
Setting detail: OUTPATIENT SURGERY
Discharge: HOME | End: 2024-12-30
Attending: INTERNAL MEDICINE
Payer: MEDICARE

## 2024-12-30 ENCOUNTER — ANESTHESIA EVENT (OUTPATIENT)
Dept: GASTROENTEROLOGY | Facility: AMBULARY SURGERY CENTER | Age: 69
End: 2024-12-30
Payer: MEDICARE

## 2024-12-30 ENCOUNTER — PREP FOR PROCEDURE (OUTPATIENT)
Dept: GASTROENTEROLOGY | Facility: CLINIC | Age: 69
End: 2024-12-30

## 2024-12-30 VITALS
RESPIRATION RATE: 18 BRPM | OXYGEN SATURATION: 96 % | TEMPERATURE: 97.5 F | DIASTOLIC BLOOD PRESSURE: 67 MMHG | HEART RATE: 74 BPM | SYSTOLIC BLOOD PRESSURE: 161 MMHG

## 2024-12-30 DIAGNOSIS — D64.9 ANEMIA, UNSPECIFIED TYPE: ICD-10-CM

## 2024-12-30 DIAGNOSIS — D50.9 IRON DEFICIENCY ANEMIA, UNSPECIFIED IRON DEFICIENCY ANEMIA TYPE: Primary | ICD-10-CM

## 2024-12-30 DIAGNOSIS — K21.9 GASTROESOPHAGEAL REFLUX DISEASE WITHOUT ESOPHAGITIS: ICD-10-CM

## 2024-12-30 DIAGNOSIS — K22.70 BARRETT'S ESOPHAGUS WITHOUT DYSPLASIA: ICD-10-CM

## 2024-12-30 LAB — GLUCOSE SERPL-MCNC: 193 MG/DL (ref 65–140)

## 2024-12-30 PROCEDURE — 45378 DIAGNOSTIC COLONOSCOPY: CPT | Performed by: INTERNAL MEDICINE

## 2024-12-30 PROCEDURE — 82948 REAGENT STRIP/BLOOD GLUCOSE: CPT

## 2024-12-30 RX ORDER — PROPOFOL 10 MG/ML
INJECTION, EMULSION INTRAVENOUS AS NEEDED
Status: DISCONTINUED | OUTPATIENT
Start: 2024-12-30 | End: 2024-12-30

## 2024-12-30 RX ORDER — LIDOCAINE HYDROCHLORIDE 10 MG/ML
INJECTION, SOLUTION EPIDURAL; INFILTRATION; INTRACAUDAL; PERINEURAL AS NEEDED
Status: DISCONTINUED | OUTPATIENT
Start: 2024-12-30 | End: 2024-12-30

## 2024-12-30 RX ORDER — PROPOFOL 10 MG/ML
INJECTION, EMULSION INTRAVENOUS CONTINUOUS PRN
Status: DISCONTINUED | OUTPATIENT
Start: 2024-12-30 | End: 2024-12-30

## 2024-12-30 RX ORDER — SODIUM CHLORIDE, SODIUM LACTATE, POTASSIUM CHLORIDE, CALCIUM CHLORIDE 600; 310; 30; 20 MG/100ML; MG/100ML; MG/100ML; MG/100ML
INJECTION, SOLUTION INTRAVENOUS CONTINUOUS PRN
Status: DISCONTINUED | OUTPATIENT
Start: 2024-12-30 | End: 2024-12-30

## 2024-12-30 RX ORDER — OMEPRAZOLE 40 MG/1
40 CAPSULE, DELAYED RELEASE ORAL DAILY
Qty: 90 CAPSULE | Refills: 1 | Status: SHIPPED | OUTPATIENT
Start: 2024-12-30 | End: 2025-03-30

## 2024-12-30 RX ORDER — SODIUM CHLORIDE, SODIUM LACTATE, POTASSIUM CHLORIDE, CALCIUM CHLORIDE 600; 310; 30; 20 MG/100ML; MG/100ML; MG/100ML; MG/100ML
125 INJECTION, SOLUTION INTRAVENOUS CONTINUOUS
OUTPATIENT
Start: 2024-12-30

## 2024-12-30 RX ADMIN — PROPOFOL 100 MCG/KG/MIN: 10 INJECTION, EMULSION INTRAVENOUS at 12:11

## 2024-12-30 RX ADMIN — LIDOCAINE HYDROCHLORIDE 50 MG: 10 INJECTION, SOLUTION EPIDURAL; INFILTRATION; INTRACAUDAL; PERINEURAL at 12:08

## 2024-12-30 RX ADMIN — SODIUM CHLORIDE, SODIUM LACTATE, POTASSIUM CHLORIDE, AND CALCIUM CHLORIDE: .6; .31; .03; .02 INJECTION, SOLUTION INTRAVENOUS at 12:02

## 2024-12-30 RX ADMIN — PROPOFOL 100 MG: 10 INJECTION, EMULSION INTRAVENOUS at 12:08

## 2024-12-30 NOTE — ANESTHESIA PREPROCEDURE EVALUATION
Procedure:  COLONOSCOPY    Relevant Problems   CARDIO   (+) Internal hemorrhoids      ENDO   (+) Diabetes 1.5, managed as type 2 (HCC)   (+) Dyslipidemia associated with type 2 diabetes mellitus  (HCC)   (+) Type 2 diabetes mellitus, without long-term current use of insulin (HCC)      GI/HEPATIC   (+) Dysphagia   (+) Gastroesophageal reflux disease without esophagitis      HEMATOLOGY   (+) Iron deficiency anemia      NEURO/PSYCH   (+) Generalized anxiety disorder      PULMONARY   (+) Mild intermittent asthma     BMI 40.44     Physical Exam    Airway    Mallampati score: III  TM Distance: >3 FB  Neck ROM: full     Dental   No notable dental hx     Cardiovascular      Pulmonary      Other Findings  post-pubertal.      Anesthesia Plan  ASA Score- 3     Anesthesia Type- IV sedation with anesthesia with ASA Monitors.         Additional Monitors:     Airway Plan:            Plan Factors-Exercise tolerance (METS): >4 METS.    Chart reviewed.    Patient summary reviewed.                  Induction- intravenous.    Postoperative Plan-     Perioperative Resuscitation Plan - Level 1 - Full Code.       Informed Consent- Anesthetic plan and risks discussed with patient.  I personally reviewed this patient with the CRNA. Discussed and agreed on the Anesthesia Plan with the CRNA..

## 2024-12-30 NOTE — ANESTHESIA POSTPROCEDURE EVALUATION
Post-Op Assessment Note    CV Status:  Stable  Pain Score: 0    Pain management: adequate       Mental Status:  Awake   Hydration Status:  Stable   PONV Controlled:  None   Airway Patency:  Patent  Two or more mitigation strategies used for obstructive sleep apnea   Post Op Vitals Reviewed: Yes    No anethesia notable event occurred.    Staff: CRNA           Last Filed PACU Vitals:  Vitals Value Taken Time   Temp     Pulse 70    /64    Resp 12    SpO2 99        Modified Hazel:  Activity: 2 (12/30/2024 11:49 AM)  Respiration: 2 (12/30/2024 11:49 AM)  Circulation: 2 (12/30/2024 11:49 AM)  Consciousness: 2 (12/30/2024 11:49 AM)  Oxygen Saturation: 2 (12/30/2024 11:49 AM)  Modified Hazel Score: 10 (12/30/2024 11:49 AM)

## 2024-12-30 NOTE — H&P
History and Physical - [facility]  Althea Kate 69 y.o. female MRN: [id number]                  HPI: Althea Kate is a 69 y.o. year old female who presents for iron deficiency anemia, history of polyps      REVIEW OF SYSTEMS: Per the HPI, and otherwise unremarkable.    Historical Information   Past Medical History:   Diagnosis Date    Anxiety     Asthma     asthma related, used inhaler 1 week ago    James's esophagus     Colon polyp     Diabetes mellitus (HCC)     NIDDM    Dysphagia     1 month ago started    GERD (gastroesophageal reflux disease)     Glaucoma     Hyperlipidemia      Past Surgical History:   Procedure Laterality Date    ABDOMINOPLASTY      also 2 abdominal hernia repairs at same time    APPENDECTOMY      BREAST BIOPSY Left 2018    CATARACT EXTRACTION      right- 8/15/23 and left 23     SECTION      CHOLECYSTECTOMY      COLONOSCOPY      HYSTERECTOMY      OOPHORECTOMY      MA INS/RPLC PERPH SAC/GSTRC NPG/RCVR PCKT CRTJ&CONN N/A 2023    Procedure: STAGE 2 AXONIC, INSERTION OF NEUROSTIMULATOR, ELECTRONIC ANALYSIS OF NEUROSTIMULATOR;  Surgeon: Jimmy Lucas MD;  Location: [facility];  Service: Urology    REDUCTION MAMMAPLASTY Bilateral     SACRAL NERVE STIMULATOR PLACEMENT N/A 2023    Procedure: PART 1, INCISION FOR IMPLANTATION OF NEUROSTIMULATOR, ELECTRONIC ANALYSIS OF NEUROSTIMULATOR;  Surgeon: Jimmy Lucas MD;  Location: [facility];  Service: Urology    UPPER GASTROINTESTINAL ENDOSCOPY      US GUIDED BREAST BIOPSY LEFT COMPLETE Left 2016     Social History   Social History     Substance and Sexual Activity   Alcohol Use Yes    Alcohol/week: 3.0 standard drinks of alcohol    Types: 3 Glasses of wine per week    Comment: social, 2 x month     Social History     Substance and Sexual Activity   Drug Use Never     Social History     Tobacco Use   Smoking Status Former    Current packs/day: 0.00    Average packs/day: 0.5  packs/day for 10.0 years (5.0 ttl pk-yrs)    Types: Cigarettes    Start date: 1983    Quit date: 11/10/1991    Years since quittin.1   Smokeless Tobacco Never   Tobacco Comments    quit 20 years ago     Family History   Problem Relation Age of Onset    Diabetes Mother     Breast cancer Mother 58    Colon cancer Father 55    Diabetes Father     No Known Problems Daughter     No Known Problems Maternal Grandmother     No Known Problems Maternal Grandfather     No Known Problems Paternal Grandmother     No Known Problems Paternal Grandfather     Diabetes Brother     Diabetes Brother     Diabetes Brother     Diabetes Brother     Breast cancer Maternal Aunt 70    No Known Problems Paternal Aunt     No Known Problems Paternal Aunt     No Known Problems Paternal Aunt     No Known Problems Son     Diabetes Brother     Diabetes Brother        Meds/Allergies       Current Outpatient Medications:     albuterol (PROVENTIL HFA,VENTOLIN HFA) 90 mcg/act inhaler    aspirin 81 MG tablet    atorvastatin (LIPITOR) 20 mg tablet    Calcium Magnesium Zinc 333-133-5 MG TABS    estradiol (ESTRACE) 1 mg tablet    flunisolide (NASALIDE) 25 MCG/ACT (0.025%) SOLN    glimepiride (AMARYL) 2 mg tablet    latanoprost (XALATAN) 0.005 % ophthalmic solution    LORazepam (ATIVAN) 1 mg tablet    metFORMIN (GLUCOPHAGE-XR) 500 mg 24 hr tablet    omeprazole (PriLOSEC) 40 MG capsule    pioglitazone (ACTOS) 30 mg tablet    celecoxib (CeleBREX) 200 mg capsule    glucose blood test strip    Ozempic, 0.25 or 0.5 MG/DOSE, 2 MG/3ML injection pen    polyethylene glycol (GOLYTELY) 4000 mL solution    Allergies   Allergen Reactions    Benzedrex [Propylhexedrine] Itching    Wound Dressing Adhesive Rash       Objective     BP (!) 173/77   Pulse 95   Temp 97.5 °F (36.4 °C) (Skin)   Resp 18   SpO2 96%       PHYSICAL EXAM    Gen: NAD  Head: NCAT  CV: RRR  CHEST: Clear  ABD: soft, NT/ND  EXT: no edema      ASSESSMENT/PLAN:  This is a 69 y.o. year old female  here for colonoscopy, and she is stable and optimized for her procedure.

## 2024-12-30 NOTE — PERIOPERATIVE NURSING NOTE
Bed locked, lowest position, side rails up. Call bell within reach. Report given to Gracia BRADFORD.

## 2025-01-02 ENCOUNTER — TELEPHONE (OUTPATIENT)
Dept: GASTROENTEROLOGY | Facility: CLINIC | Age: 70
End: 2025-01-02

## 2025-01-02 NOTE — TELEPHONE ENCOUNTER
Pt scheduled for capsule on 1/9/25 and instructions emailed to pt's email on file. She will call back if she has any questions. Thanks Yamilka

## 2025-01-09 ENCOUNTER — TELEPHONE (OUTPATIENT)
Dept: GASTROENTEROLOGY | Facility: CLINIC | Age: 70
End: 2025-01-09

## 2025-01-09 ENCOUNTER — PROCEDURE VISIT (OUTPATIENT)
Dept: GASTROENTEROLOGY | Facility: CLINIC | Age: 70
End: 2025-01-09

## 2025-01-09 DIAGNOSIS — D50.9 IRON DEFICIENCY ANEMIA, UNSPECIFIED IRON DEFICIENCY ANEMIA TYPE: ICD-10-CM

## 2025-01-09 NOTE — PROGRESS NOTES
Pt here for capsule endoscopy remained NPO. Went over all instructions, Pt confirmed no MRI scheduled. pt verbalized understanding. Pt tolerated procedure well. Pt will return to office at 4:00.

## 2025-01-10 NOTE — PROGRESS NOTES
Technician note reviewed, please refer to procedure notes for study interpretation.    Galindo Renner MD 01/10/25

## 2025-01-16 ENCOUNTER — TELEPHONE (OUTPATIENT)
Dept: GASTROENTEROLOGY | Facility: CLINIC | Age: 70
End: 2025-01-16

## 2025-01-16 NOTE — TELEPHONE ENCOUNTER
----- Message from Galindo Renner MD sent at 1/15/2025  3:02 PM EST -----  Regarding: capsule  Capsule endoscopy normal. Report done.

## 2025-01-24 ENCOUNTER — TELEPHONE (OUTPATIENT)
Dept: GASTROENTEROLOGY | Facility: CLINIC | Age: 70
End: 2025-01-24

## 2025-01-24 NOTE — TELEPHONE ENCOUNTER
----- Message from Galindo Renner MD sent at 1/16/2025 11:54 AM EST -----  Regarding: RE: capsule  Report is done and signed. Not sure why you can't see it. I will take a look the next time I have access  ----- Message -----  From: Fanta Robin  Sent: 1/16/2025  11:48 AM EST  To: Galindo Renner MD  Subject: FW: capsule                                      Report appears not done? Please do report so it can be printed. Thank you.  ----- Message -----  From: Galindo Renner MD  Sent: 1/15/2025   3:03 PM EST  To: [place] [id number] [address]  Clinical  Subject: capsule                                          Capsule endoscopy normal. Report done.

## 2025-03-26 ENCOUNTER — ANNUAL EXAM (OUTPATIENT)
Dept: OBGYN CLINIC | Facility: CLINIC | Age: 70
End: 2025-03-26

## 2025-03-26 VITALS
BODY MASS INDEX: 39.29 KG/M2 | HEIGHT: 65 IN | RESPIRATION RATE: 18 BRPM | DIASTOLIC BLOOD PRESSURE: 80 MMHG | HEART RATE: 98 BPM | SYSTOLIC BLOOD PRESSURE: 153 MMHG | WEIGHT: 235.8 LBS

## 2025-03-26 DIAGNOSIS — Z01.419 ENCOUNTER FOR ANNUAL ROUTINE GYNECOLOGICAL EXAMINATION: ICD-10-CM

## 2025-03-26 DIAGNOSIS — N95.2 VAGINAL ATROPHY: ICD-10-CM

## 2025-03-26 DIAGNOSIS — R22.31 AXILLARY MASS, RIGHT: Primary | ICD-10-CM

## 2025-03-26 PROCEDURE — G0101 CA SCREEN;PELVIC/BREAST EXAM: HCPCS | Performed by: OBSTETRICS & GYNECOLOGY

## 2025-03-26 RX ORDER — FERROUS SULFATE 325(65) MG
1 TABLET, DELAYED RELEASE (ENTERIC COATED) ORAL
COMMUNITY
Start: 2024-12-03

## 2025-03-26 NOTE — PROGRESS NOTES
"Name: Althea Kate      : 1955      MRN: [id number]  Encounter Provider: Patrick Jung MD  Encounter Date: 3/26/2025   Encounter department: [facility]  :  Assessment & Plan  Axillary mass, right         Encounter for annual routine gynecological examination         Vaginal atrophy             Normal breast exam  Follicular cysts versus lymph node in the right axilla  Vaginal atrophy  Hysterectomy BSO  Interstitial cystitis  DIANE  Normal mammogram 2024  Colonoscopy with polyps   Nerve stimulator for her as she will      Pain:  Rx mammogram.  Men healthy diet and exercise.  Recommend daily Kegels.    and a     Althea Kate is a 69 y.o. female who presents for annual exam no complaints.  Denies any pelvic pain vaginal bleeding or dyspareunia.  Has a nerve stimulator for DIANE.  Denies any breast bowel or bladder issues.  Did notice a small lesion under her right axilla.  Had a prior follicular cyst on her left axilla which resolved.  Examination today this appears to be a early follicular cyst.  Scheduled for mammogram in the next few weeks.  No change in family history.  Medications reviewed.  Traveling regularly with her  in a motor home.       Objective   /80 (BP Location: Left arm, Patient Position: Sitting, Cuff Size: Large)   Pulse 98   Resp 18   Ht 5' 5\" (1.651 m)   Wt 107 kg (235 lb 12.8 oz)   BMI 39.24 kg/m²      Physical Exam  Vitals and nursing note reviewed.   Constitutional:       General: She is not in acute distress.     Appearance: She is well-developed.   HENT:      Head: Normocephalic and atraumatic.   Eyes:      Conjunctiva/sclera: Conjunctivae normal.   Cardiovascular:      Rate and Rhythm: Normal rate and regular rhythm.      Heart sounds: No murmur heard.  Pulmonary:      Effort: Pulmonary effort is normal. No respiratory distress.      Breath sounds: Normal breath sounds.   Chest: "   Breasts:     Right: Normal.      Left: Normal.       Abdominal:      Palpations: Abdomen is soft.      Tenderness: There is no abdominal tenderness.   Genitourinary:     Vagina: Normal.      Rectum: Normal.      Comments: Mild vaginal atrophy.  No vaginal vault prolapse cystocele or rectocele.  Cervix uterus tubes and ovaries absent  Musculoskeletal:         General: No swelling.      Cervical back: Neck supple.   Lymphadenopathy:      Upper Body:      Right upper body: Axillary adenopathy present.      Left upper body: No supraclavicular, axillary or pectoral adenopathy.   Skin:     General: Skin is warm and dry.      Capillary Refill: Capillary refill takes less than 2 seconds.   Neurological:      Mental Status: She is alert.   Psychiatric:         Mood and Affect: Mood normal.

## 2025-04-09 ENCOUNTER — HOSPITAL ENCOUNTER (OUTPATIENT)
Dept: RADIOLOGY | Age: 70
Discharge: HOME | End: 2025-04-09
Payer: MEDICARE

## 2025-04-09 DIAGNOSIS — Z12.31 VISIT FOR SCREENING MAMMOGRAM: ICD-10-CM

## 2025-04-09 PROCEDURE — 77063 BREAST TOMOSYNTHESIS BI: CPT

## 2025-04-09 PROCEDURE — 77067 SCR MAMMO BI INCL CAD: CPT

## 2025-04-15 ENCOUNTER — RESULTS FOLLOW-UP (OUTPATIENT)
Dept: OTHER | Facility: HOSPITAL | Age: 70
End: 2025-04-15

## 2025-07-02 ENCOUNTER — OFFICE VISIT (OUTPATIENT)
Dept: OBGYN CLINIC | Facility: CLINIC | Age: 70
End: 2025-07-02

## 2025-07-02 VITALS
HEIGHT: 65 IN | WEIGHT: 241 LBS | RESPIRATION RATE: 18 BRPM | HEART RATE: 89 BPM | BODY MASS INDEX: 40.15 KG/M2 | SYSTOLIC BLOOD PRESSURE: 128 MMHG | DIASTOLIC BLOOD PRESSURE: 81 MMHG

## 2025-07-02 DIAGNOSIS — R10.2 PELVIC PAIN: Primary | ICD-10-CM

## 2025-07-02 DIAGNOSIS — E78.5 DYSLIPIDEMIA ASSOCIATED WITH TYPE 2 DIABETES MELLITUS  (HCC): ICD-10-CM

## 2025-07-02 DIAGNOSIS — E11.69 DYSLIPIDEMIA ASSOCIATED WITH TYPE 2 DIABETES MELLITUS  (HCC): ICD-10-CM

## 2025-07-02 DIAGNOSIS — N95.2 VAGINAL ATROPHY: ICD-10-CM

## 2025-07-02 DIAGNOSIS — N89.8 VAGINAL DISCHARGE: ICD-10-CM

## 2025-07-02 LAB
CANDIDA RRNA VAG QL PROBE: NOT DETECTED
G VAGINALIS RRNA GENITAL QL PROBE: NOT DETECTED
SL AMB  POCT GLUCOSE, UA: ABNORMAL
SL AMB LEUKOCYTE ESTERASE,UA: ABNORMAL
SL AMB POCT BILIRUBIN,UA: ABNORMAL
SL AMB POCT BLOOD,UA: ABNORMAL
SL AMB POCT CLARITY,UA: CLEAR
SL AMB POCT COLOR,UA: YELLOW
SL AMB POCT KETONES,UA: ABNORMAL
SL AMB POCT NITRITE,UA: ABNORMAL
SL AMB POCT PH,UA: 6
SL AMB POCT SPECIFIC GRAVITY,UA: 1.01
SL AMB POCT URINE PROTEIN: ABNORMAL
SL AMB POCT UROBILINOGEN: 0.2
T VAGINALIS RRNA GENITAL QL PROBE: NOT DETECTED

## 2025-07-02 PROCEDURE — 87480 CANDIDA DNA DIR PROBE: CPT | Performed by: OBSTETRICS & GYNECOLOGY

## 2025-07-02 PROCEDURE — 81003 URINALYSIS AUTO W/O SCOPE: CPT | Performed by: OBSTETRICS & GYNECOLOGY

## 2025-07-02 PROCEDURE — 87660 TRICHOMONAS VAGIN DIR PROBE: CPT | Performed by: OBSTETRICS & GYNECOLOGY

## 2025-07-02 PROCEDURE — 99212 OFFICE O/P EST SF 10 MIN: CPT | Performed by: OBSTETRICS & GYNECOLOGY

## 2025-07-02 PROCEDURE — 87510 GARDNER VAG DNA DIR PROBE: CPT | Performed by: OBSTETRICS & GYNECOLOGY

## 2025-07-02 RX ORDER — ESTRADIOL 0.1 MG/G
1 CREAM VAGINAL 2 TIMES WEEKLY
Qty: 42.5 G | Refills: 0 | Status: SHIPPED | OUTPATIENT
Start: 2025-07-03

## 2025-07-02 NOTE — PROGRESS NOTES
69-year-old presented to the office today complaining of insertional dyspareunia over the last month.  Patient was on oral estrogen up until January when she ran out.  Also made the decision to discontinue it.  Denies any vaginal bleeding.  Not using any vaginal lubricants.  Denies any vaginal discharge.  Denies any urinary symptoms.    History of hysterectomy and BSO.    UA dip on today's examination was negative.    Physical exam: Abdomen soft nontender.  External genitalia atrophic.  Vagina slight green discharge without odor.  Vaginal cuff well supported without cystocele or rectocele.  No adnexal masses.    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  Vaginal discharge.    Plan: Rx Estrace vaginal cream 1 mg twice a week.  Check vaginal culture before treatment.  Schedule annual exam which is due next month.

## (undated) DEVICE — SUT VICRYL 2-0 SH 27 IN UNDYED J417H

## (undated) DEVICE — 3M™ TEGADERM™ TRANSPARENT FILM DRESSING FRAME STYLE, 1624W, 2-3/8 IN X 2-3/4 IN (6 CM X 7 CM), 100/CT 4CT/CASE: Brand: 3M™ TEGADERM™

## (undated) DEVICE — GLOVE SRG BIOGEL 8

## (undated) DEVICE — CURITY NON-ADHERENT STRIPS: Brand: CURITY

## (undated) DEVICE — DRAPE C-ARM X-RAY

## (undated) DEVICE — SUT MONOCRYL 4-0 PC-3 18 IN Y845G

## (undated) DEVICE — INTENDED FOR TISSUE SEPARATION, AND OTHER PROCEDURES THAT REQUIRE A SHARP SURGICAL BLADE TO PUNCTURE OR CUT.: Brand: BARD-PARKER SAFETY BLADES SIZE 11, STERILE

## (undated) DEVICE — REMOTE CONTROL: Brand: AXONICS

## (undated) DEVICE — SUT VICRYL 4-0 PS-2 18 IN J496G

## (undated) DEVICE — DRAPE SHEET X-LG

## (undated) DEVICE — 3M™ TEGADERM™ TRANSPARENT FILM DRESSING FRAME STYLE, 1628, 6 IN X 8 IN (15 CM X 20 CM), 10/CT 8CT/CASE: Brand: 3M™ TEGADERM™

## (undated) DEVICE — DRAPE UTILITY

## (undated) DEVICE — PROVE COVER: Brand: UNBRANDED

## (undated) DEVICE — LEAD IMPLANT KIT: Brand: AXONICS

## (undated) DEVICE — PACK GENERAL LF

## (undated) DEVICE — 3M™ TEGADERM™ TRANSPARENT FILM DRESSING FRAME STYLE, 1626W, 4 IN X 4-3/4 IN (10 CM X 12 CM), 50/CT 4CT/CASE: Brand: 3M™ TEGADERM™

## (undated) DEVICE — TRIAL STIMULATOR: Brand: AXONICS

## (undated) DEVICE — SUT PDS II 2-0 CT-1 27 IN Z259H

## (undated) DEVICE — CHLORAPREP HI-LITE 26ML ORANGE

## (undated) DEVICE — DRAPE C-ARMOUR

## (undated) DEVICE — STANDARD SURGICAL GOWN, L: Brand: CONVERTORS

## (undated) DEVICE — BASIC DOUBLE BASIN 2-LF: Brand: MEDLINE INDUSTRIES, INC.

## (undated) DEVICE — SPONGE GAUZE 4 X 9

## (undated) DEVICE — DRAPE LAPAROTOMY W/POUCHES

## (undated) DEVICE — 3M™ STERI-STRIP™ REINFORCED ADHESIVE SKIN CLOSURES, R1547, 1/2 IN X 4 IN (12 MM X 100 MM), 6 STRIPS/ENVELOPE: Brand: 3M™ STERI-STRIP™